# Patient Record
Sex: MALE | Race: WHITE | Employment: FULL TIME | URBAN - METROPOLITAN AREA
[De-identification: names, ages, dates, MRNs, and addresses within clinical notes are randomized per-mention and may not be internally consistent; named-entity substitution may affect disease eponyms.]

---

## 2017-01-10 ENCOUNTER — APPOINTMENT (OUTPATIENT)
Dept: GENERAL RADIOLOGY | Age: 42
End: 2017-01-10
Attending: EMERGENCY MEDICINE
Payer: COMMERCIAL

## 2017-01-10 ENCOUNTER — HOSPITAL ENCOUNTER (OUTPATIENT)
Age: 42
Setting detail: OBSERVATION
Discharge: HOME | End: 2017-01-12
Attending: EMERGENCY MEDICINE | Admitting: INTERNAL MEDICINE
Payer: COMMERCIAL

## 2017-01-10 DIAGNOSIS — I48.91 ATRIAL FIBRILLATION WITH RVR (HCC): Primary | ICD-10-CM

## 2017-01-10 DIAGNOSIS — R07.9 CHEST PAIN, UNSPECIFIED TYPE: ICD-10-CM

## 2017-01-10 LAB
ALBUMIN SERPL BCP-MCNC: 4 G/DL (ref 3.5–5)
ALBUMIN/GLOB SERPL: 1.1 {RATIO} (ref 1.2–3.5)
ALP SERPL-CCNC: 94 U/L (ref 50–136)
ALT SERPL-CCNC: 21 U/L (ref 12–65)
ANION GAP BLD CALC-SCNC: 9 MMOL/L (ref 7–16)
AST SERPL W P-5'-P-CCNC: 25 U/L (ref 15–37)
ATRIAL RATE: 133 BPM
BASOPHILS # BLD AUTO: 0 K/UL (ref 0–0.2)
BASOPHILS # BLD: 0 % (ref 0–2)
BILIRUB SERPL-MCNC: 1.4 MG/DL (ref 0.2–1.1)
BUN SERPL-MCNC: 8 MG/DL (ref 6–23)
CALCIUM SERPL-MCNC: 8.2 MG/DL (ref 8.3–10.4)
CALCULATED P AXIS, ECG09: NORMAL DEGREES
CALCULATED R AXIS, ECG10: 54 DEGREES
CALCULATED T AXIS, ECG11: 65 DEGREES
CHLORIDE SERPL-SCNC: 107 MMOL/L (ref 98–107)
CO2 SERPL-SCNC: 28 MMOL/L (ref 21–32)
CREAT SERPL-MCNC: 0.86 MG/DL (ref 0.8–1.5)
DIAGNOSIS, 93000: NORMAL
DIASTOLIC BP, ECG02: NORMAL MMHG
DIFFERENTIAL METHOD BLD: ABNORMAL
EOSINOPHIL # BLD: 0.2 K/UL (ref 0–0.8)
EOSINOPHIL NFR BLD: 5 % (ref 0.5–7.8)
ERYTHROCYTE [DISTWIDTH] IN BLOOD BY AUTOMATED COUNT: 13.1 % (ref 11.9–14.6)
GLOBULIN SER CALC-MCNC: 3.6 G/DL (ref 2.3–3.5)
GLUCOSE SERPL-MCNC: 109 MG/DL (ref 65–100)
HCT VFR BLD AUTO: 36.2 % (ref 41.1–50.3)
HGB BLD-MCNC: 12.7 G/DL (ref 13.6–17.2)
IMM GRANULOCYTES # BLD: 0 K/UL (ref 0–0.5)
IMM GRANULOCYTES NFR BLD AUTO: 0 % (ref 0–5)
LYMPHOCYTES # BLD AUTO: 29 % (ref 13–44)
LYMPHOCYTES # BLD: 1.4 K/UL (ref 0.5–4.6)
MCH RBC QN AUTO: 43.1 PG (ref 26.1–32.9)
MCHC RBC AUTO-ENTMCNC: 35.1 G/DL (ref 31.4–35)
MCV RBC AUTO: 122.7 FL (ref 79.6–97.8)
MONOCYTES # BLD: 0.8 K/UL (ref 0.1–1.3)
MONOCYTES NFR BLD AUTO: 17 % (ref 4–12)
NEUTS SEG # BLD: 2.3 K/UL (ref 1.7–8.2)
NEUTS SEG NFR BLD AUTO: 49 % (ref 43–78)
P-R INTERVAL, ECG05: NORMAL MS
PLATELET # BLD AUTO: 278 K/UL (ref 150–450)
PMV BLD AUTO: 9.4 FL (ref 10.8–14.1)
POTASSIUM SERPL-SCNC: 4.2 MMOL/L (ref 3.5–5.1)
PROT SERPL-MCNC: 7.6 G/DL (ref 6.3–8.2)
Q-T INTERVAL, ECG07: 298 MS
QRS DURATION, ECG06: 78 MS
QTC CALCULATION (BEZET), ECG08: 433 MS
RBC # BLD AUTO: 2.95 M/UL (ref 4.23–5.67)
SODIUM SERPL-SCNC: 144 MMOL/L (ref 136–145)
SYSTOLIC BP, ECG01: NORMAL MMHG
TROPONIN I BLD-MCNC: 0 NG/ML (ref 0–0.08)
TSH SERPL DL<=0.005 MIU/L-ACNC: 4.14 UIU/ML (ref 0.36–3.74)
VENTRICULAR RATE, ECG03: 127 BPM
WBC # BLD AUTO: 4.8 K/UL (ref 4.3–11.1)

## 2017-01-10 PROCEDURE — 84484 ASSAY OF TROPONIN QUANT: CPT

## 2017-01-10 PROCEDURE — 84443 ASSAY THYROID STIM HORMONE: CPT | Performed by: EMERGENCY MEDICINE

## 2017-01-10 PROCEDURE — 71010 XR CHEST PORT: CPT

## 2017-01-10 PROCEDURE — 99285 EMERGENCY DEPT VISIT HI MDM: CPT | Performed by: EMERGENCY MEDICINE

## 2017-01-10 PROCEDURE — 96375 TX/PRO/DX INJ NEW DRUG ADDON: CPT | Performed by: EMERGENCY MEDICINE

## 2017-01-10 PROCEDURE — G0378 HOSPITAL OBSERVATION PER HR: HCPCS

## 2017-01-10 PROCEDURE — 99218 HC RM OBSERVATION: CPT

## 2017-01-10 PROCEDURE — 74011250636 HC RX REV CODE- 250/636: Performed by: INTERNAL MEDICINE

## 2017-01-10 PROCEDURE — 80053 COMPREHEN METABOLIC PANEL: CPT | Performed by: EMERGENCY MEDICINE

## 2017-01-10 PROCEDURE — 93005 ELECTROCARDIOGRAM TRACING: CPT | Performed by: EMERGENCY MEDICINE

## 2017-01-10 PROCEDURE — 93005 ELECTROCARDIOGRAM TRACING: CPT | Performed by: INTERNAL MEDICINE

## 2017-01-10 PROCEDURE — 36415 COLL VENOUS BLD VENIPUNCTURE: CPT | Performed by: INTERNAL MEDICINE

## 2017-01-10 PROCEDURE — 96374 THER/PROPH/DIAG INJ IV PUSH: CPT | Performed by: EMERGENCY MEDICINE

## 2017-01-10 PROCEDURE — 85025 COMPLETE CBC W/AUTO DIFF WBC: CPT | Performed by: EMERGENCY MEDICINE

## 2017-01-10 PROCEDURE — 74011250637 HC RX REV CODE- 250/637: Performed by: EMERGENCY MEDICINE

## 2017-01-10 PROCEDURE — 96365 THER/PROPH/DIAG IV INF INIT: CPT

## 2017-01-10 PROCEDURE — 74011000250 HC RX REV CODE- 250: Performed by: EMERGENCY MEDICINE

## 2017-01-10 RX ORDER — SOTALOL HYDROCHLORIDE 80 MG/1
160 TABLET ORAL EVERY 12 HOURS
Status: DISCONTINUED | OUTPATIENT
Start: 2017-01-11 | End: 2017-01-11

## 2017-01-10 RX ORDER — HEPARIN SODIUM 5000 [USP'U]/100ML
12-25 INJECTION, SOLUTION INTRAVENOUS
Status: DISCONTINUED | OUTPATIENT
Start: 2017-01-10 | End: 2017-01-11

## 2017-01-10 RX ORDER — HEPARIN SODIUM 5000 [USP'U]/ML
4000 INJECTION, SOLUTION INTRAVENOUS; SUBCUTANEOUS ONCE
Status: COMPLETED | OUTPATIENT
Start: 2017-01-10 | End: 2017-01-10

## 2017-01-10 RX ORDER — DILTIAZEM HYDROCHLORIDE 120 MG/1
120 CAPSULE, COATED, EXTENDED RELEASE ORAL
Status: COMPLETED | OUTPATIENT
Start: 2017-01-10 | End: 2017-01-10

## 2017-01-10 RX ORDER — DILTIAZEM HYDROCHLORIDE 5 MG/ML
20 INJECTION INTRAVENOUS
Status: COMPLETED | OUTPATIENT
Start: 2017-01-10 | End: 2017-01-10

## 2017-01-10 RX ADMIN — HEPARIN SODIUM AND DEXTROSE 12 UNITS/KG/HR: 5000; 5 INJECTION INTRAVENOUS at 23:25

## 2017-01-10 RX ADMIN — DILTIAZEM HYDROCHLORIDE 120 MG: 120 CAPSULE, COATED, EXTENDED RELEASE ORAL at 18:32

## 2017-01-10 RX ADMIN — DILTIAZEM HYDROCHLORIDE 20 MG: 5 INJECTION INTRAVENOUS at 18:47

## 2017-01-10 RX ADMIN — HEPARIN SODIUM 4000 UNITS: 5000 INJECTION, SOLUTION INTRAVENOUS; SUBCUTANEOUS at 23:26

## 2017-01-10 NOTE — IP AVS SNAPSHOT
Current Discharge Medication List  
  
Take these medications at their scheduled times Dose & Instructions Dispensing Information Comments Morning Noon Evening Bedtime  
 aspirin 81 mg chewable tablet Your next dose is:  Tomorrow Dose:  81 mg Take 1 Tab by mouth daily. Quantity:  30 Tab Refills:  0  
     
  
   
   
   
  
 atorvastatin 80 mg tablet Commonly known as:  LIPITOR Your next dose is: Today Dose:  80 mg Take 1 Tab by mouth nightly. Quantity:  30 Tab Refills:  11  
     
   
   
   
  
  
 metoprolol tartrate 25 mg tablet Commonly known as:  LOPRESSOR Your next dose is: Today Dose:  25 mg Take 1 Tab by mouth every twelve (12) hours. Quantity:  60 Tab Refills:  11  
     
   
   
   
  
  
 prasugrel 10 mg tablet Commonly known as:  EFFIENT Your next dose is:  Tomorrow Dose:  10 mg Take 1 Tab by mouth daily. Quantity:  30 Tab Refills:  11 Take these medications as needed Dose & Instructions Dispensing Information Comments Morning Noon Evening Bedtime  
 nitroglycerin 0.4 mg SL tablet Commonly known as:  NITROSTAT Dose:  0.4 mg  
1 Tab by SubLINGual route every five (5) minutes as needed for Chest Pain. Quantity:  1 Bottle Refills:  5 Where to Get Your Medications Information about where to get these medications is not yet available ! Ask your nurse or doctor about these medications  
  aspirin 81 mg chewable tablet  
 atorvastatin 80 mg tablet  
 metoprolol tartrate 25 mg tablet  
 nitroglycerin 0.4 mg SL tablet  
 prasugrel 10 mg tablet

## 2017-01-10 NOTE — IP AVS SNAPSHOT
[address] 
[phone] Patient: Lia Burkitt MRN: [id number] You are allergic to the following No active allergies Immunizations Administered for This Admission Name Date Influenza Vaccine (Quad) PF 1/12/2017 Recent Documentation Height Weight BMI  
  
  
 1.753 m 83.1 kg 27.05 kg/m2 Emergency Contacts Name Discharge Info Relation Home Work Mobile Nya Friend  Spouse [3] [phone] About your hospitalization You were admitted on:  January 10, 2017 You last received care in the:  [facility] 2 CV STEPDOWN You were discharged on:  January 12, 2017 Unit phone number:  [phone] Why you were hospitalized Your primary diagnosis was:  Not on File Your diagnoses also included:  Paroxysmal Atrial Fibrillation (Hcc), Precordial Pain, Coronary Artery Disease Involving Native Coronary Artery Of Native Heart With Unstable Angina Pectoris (Hcc) Providers Seen During Your Hospitalizations Provider Role Specialty Primary office phone Ngozi Marcial MD Attending Provider Emergency Medicine [phone] Ra Adler MD Attending Provider Cardiology [phone] Your Primary Care Physician (PCP) Primary Care Physician Office Phone Office Fax UNKNOWN, PROVIDER ** None ** ** None ** Follow-up Information Follow up With Details Comments Contact Info Provider Unknown   Patient not available to ask Ra Adler MD On 1/17/2017 at 1 am in [place] office [address] [facility] [address] 
[phone] Your Appointments Tuesday January 17, 2017  8:30 AM EST TRANSITIONAL CARE MANAGEMENT with Ra Adler MD  
[facility] Cardiology ([address] [place] [address]) [address] [place]  
[address] [facility] 81  
[phone] Current Discharge Medication List  
  
START taking these medications Dose & Instructions Dispensing Information Comments Morning Noon Evening Bedtime  
 aspirin 81 mg chewable tablet Your next dose is:  Tomorrow Dose:  81 mg Take 1 Tab by mouth daily. Quantity:  30 Tab Refills:  0  
     
  
   
   
   
  
 atorvastatin 80 mg tablet Commonly known as:  LIPITOR Your next dose is: Today Dose:  80 mg Take 1 Tab by mouth nightly. Quantity:  30 Tab Refills:  11  
     
   
   
   
  
  
 metoprolol tartrate 25 mg tablet Commonly known as:  LOPRESSOR Your next dose is: Today Dose:  25 mg Take 1 Tab by mouth every twelve (12) hours. Quantity:  60 Tab Refills:  11  
     
   
   
   
  
  
 nitroglycerin 0.4 mg SL tablet Commonly known as:  NITROSTAT Dose:  0.4 mg  
1 Tab by SubLINGual route every five (5) minutes as needed for Chest Pain. Quantity:  1 Bottle Refills:  5  
     
   
   
   
  
 prasugrel 10 mg tablet Commonly known as:  EFFIENT Your next dose is:  Tomorrow Dose:  10 mg Take 1 Tab by mouth daily. Quantity:  30 Tab Refills:  11 Where to Get Your Medications Information on where to get these meds will be given to you by the nurse or doctor. ! Ask your nurse or doctor about these medications  
  aspirin 81 mg chewable tablet  
 atorvastatin 80 mg tablet  
 metoprolol tartrate 25 mg tablet  
 nitroglycerin 0.4 mg SL tablet  
 prasugrel 10 mg tablet Discharge Instructions DISCHARGE SUMMARY from Nurse The following personal items are in your possession at time of discharge: 
 
Dental Appliances: None Home Medications: None Jewelry: None Clothing: With patient sent home with patient Other Valuables: None PATIENT INSTRUCTIONS: 
 
 
F-face looks uneven A-arms unable to move or move unevenly S-speech slurred or non-existent T-time-call 911 as soon as signs and symptoms begin-DO NOT go Back to bed or wait to see if you get better-TIME IS BRAIN. Warning Signs of HEART ATTACK Call 911 if you have these symptoms: 
? Chest discomfort. Most heart attacks involve discomfort in the center of the chest that lasts more than a few minutes, or that goes away and comes back. It can feel like uncomfortable pressure, squeezing, fullness, or pain. ? Discomfort in other areas of the upper body. Symptoms can include pain or discomfort in one or both arms, the back, neck, jaw, or stomach. ? Shortness of breath with or without chest discomfort. ? Other signs may include breaking out in a cold sweat, nausea, or lightheadedness. Don't wait more than five minutes to call [address]! Fast action can save your life. Calling 911 is almost always the fastest way to get lifesaving treatment. Emergency Medical Services staff can begin treatment when they arrive  up to an hour sooner than if someone gets to the hospital by car. The discharge information has been reviewed with the patient and spouse. The patient and spouse verbalized understanding. Discharge medications reviewed with the patient and spouse and appropriate educational materials and side effects teaching were provided. Learning About Coronary Artery Disease (CAD) What is coronary artery disease? Coronary artery disease (CAD) occurs when plaque builds up in the arteries that bring oxygen-rich blood to your heart. Plaque is a fatty substance made of cholesterol, calcium, and other substances in the blood. This process is called hardening of the arteries, or atherosclerosis. What happens when you have coronary artery disease? · Plaque may narrow the coronary arteries. Narrowed arteries cause poor blood flow.  This can lead to angina symptoms such as chest pain or discomfort. If blood flow is completely blocked, you could have a heart attack. · You can slow CAD and reduce the risk of future problems by making changes in your lifestyle. These include quitting smoking and eating heart-healthy foods. · Treatments for CAD, along with changes in your lifestyle, can help you live a longer and healthier life. How can you prevent coronary artery disease? · Do not smoke. It may be the best thing you can do to prevent heart disease. If you need help quitting, talk to your doctor about stop-smoking programs and medicines. These can increase your chances of quitting for good. · Be active. Get at least 30 minutes of exercise on most days of the week. Walking is a good choice. You also may want to do other activities, such as running, swimming, cycling, or playing tennis or team sports. · Eat heart-healthy foods. Eat more fruits and vegetables and less foods that contain saturated and trans fats. Limit alcohol, sodium, and sweets. · Stay at a healthy weight. Lose weight if you need to. · Manage other health problems such as diabetes, high blood pressure, and high cholesterol. · Manage stress. Stress can hurt your heart. To keep stress low, talk about your problems and feelings. Don't keep your feelings hidden. · If you have talked about it with your doctor, take a low-dose aspirin every day. Aspirin can help certain people lower their risk of a heart attack or stroke. But taking aspirin isn't right for everyone, because it can cause serious bleeding. Do not start taking daily aspirin unless your doctor knows about it. How is coronary artery disease treated? · Your doctor will suggest that you make lifestyle changes. For example, your doctor may ask you to eat healthy foods, quit smoking, lose extra weight, and be more active. · You will have to take medicines.  
· Your doctor may suggest a procedure to open narrowed or blocked arteries. This is called angioplasty. Or your doctor may suggest using healthy blood vessels to create detours around narrowed or blocked arteries. This is called bypass surgery. Follow-up care is a key part of your treatment and safety. Be sure to make and go to all appointments, and call your doctor if you are having problems. It's also a good idea to know your test results and keep a list of the medicines you take. Where can you learn more? Go to http://lynette-gina.info/. Enter [phone] in the search box to learn more about \"Learning About Coronary Artery Disease (CAD). \" Current as of: January 27, 2016 Content Version: 11.1 © 3828-3617 ProBinder. Care instructions adapted under license by [employer] (which disclaims liability or warranty for this information). If you have questions about a medical condition or this instruction, always ask your healthcare professional. [place] [address] any warranty or liability for your use of this information. Percutaneous Coronary Intervention: What to Expect at [facility] Your Recovery Percutaneous coronary intervention (PCI) is the name for procedures that are used to open a narrowed or blocked coronary artery. The two most common PCI procedures are coronary angioplasty and coronary stent placement. Your groin or arm may have a bruise and feel sore for a day or two after a percutaneous coronary intervention (PCI). You can do light activities around the house, but nothing strenuous for several days. This care sheet gives you a general idea about how long it will take for you to recover. But each person recovers at a different pace. Follow the steps below to get better as quickly as possible. How can you care for yourself at home? Activity · Do not do strenuous exercise and do not lift, pull, or push anything heavy until your doctor says it is okay. This may be for a day or two.  You can walk around the house and do light activity, such as cooking. · You may shower 24 to 48 hours after the procedure, if your doctor okays it. Pat the incision dry. Do not take a bath for 1 week, or until your doctor tells you it is okay. · If the catheter was placed in your groin, try not to walk up stairs for the first couple of days. · If the catheter was placed in your arm near your wrist, do not bend your wrist deeply for the first couple of days. Be careful using your hand to get into and out of a chair or bed. · If your doctor recommends it, get more exercise. Walking is a good choice. Bit by bit, increase the amount you walk every day. Try for at least 30 minutes on most days of the week. Diet · Drink plenty of fluids to help your body flush out the dye. If you have kidney, heart, or liver disease and have to limit fluids, talk with your doctor before you increase the amount of fluids you drink. · Keep eating a heart-healthy diet that has lots of fruits, vegetables, and whole grains. If you have not been eating this way, talk to your doctor. You also may want to talk to a dietitian. This expert can help you to learn about healthy foods and plan meals. Medicines · Your doctor will tell you if and when you can restart your medicines. He or she will also give you instructions about taking any new medicines. · If you take blood thinners, such as warfarin (Coumadin), clopidogrel (Plavix), or aspirin, be sure to talk to your doctor. He or she will tell you if and when to start taking those medicines again. Make sure that you understand exactly what your doctor wants you to do. · Your doctor will prescribe blood-thinning medicines. You will likely take aspirin plus another antiplatelet, such as clopidogrel (Plavix). It is very important that you take these medicines exactly as directed. These medicines help keep the coronary artery open and reduce your risk of a heart attack. · Call your doctor if you think you are having a problem with your medicine. Care of the catheter site · For 1 or 2 days, keep a bandage over the spot where the catheter was inserted. The bandage probably will fall off in this time. · Put ice or a cold pack on the area for 10 to 20 minutes at a time to help with soreness or swelling. Put a thin cloth between the ice and your skin. Follow-up care is a key part of your treatment and safety. Be sure to make and go to all appointments, and call your doctor if you are having problems. It's also a good idea to know your test results and keep a list of the medicines you take. When should you call for help? Call 911 anytime you think you may need emergency care. For example, call if: 
· You passed out (lost consciousness). · You have severe trouble breathing. · You have sudden chest pain and shortness of breath, or you cough up blood. · You have symptoms of a heart attack, such as: ¨ Chest pain or pressure. ¨ Sweating. ¨ Shortness of breath. ¨ Nausea or vomiting. ¨ Pain that spreads from the chest to the neck, jaw, or one or both shoulders or arms. ¨ Dizziness or lightheadedness. ¨ A fast or uneven pulse. After calling 911, chew 1 adult-strength aspirin. Wait for an ambulance. Do not try to drive yourself. · You have been diagnosed with angina, and you have angina symptoms that do not go away with rest or are not getting better within 5 minutes after you take one dose of nitroglycerin. Call your doctor now or seek immediate medical care if: 
· You are bleeding from the area where the catheter was put in your artery. · You have a fast-growing, painful lump at the catheter site. · You have signs of infection, such as: 
¨ Increased pain, swelling, warmth, or redness. ¨ Red streaks leading from the catheter site. ¨ Pus draining from the catheter site. ¨ A fever. · Your leg or arm looks blue or feels cold, numb, or tingly. Watch closely for changes in your health, and be sure to contact your doctor if you have any problems. Where can you learn more? Go to http://lynette-gina.info/. Enter M655 in the search box to learn more about \"Percutaneous Coronary Intervention: What to Expect at Home. \" Current as of: January 27, 2016 Content Version: 11.1 © 8817-2229 Screenie. Care instructions adapted under license by [employer] (which disclaims liability or warranty for this information). If you have questions about a medical condition or this instruction, always ask your healthcare professional. [place] [address] any warranty or liability for your use of this information. Discharge Instructions Attachments/References HEART: GENERAL INFO (ENGLISH) HEALTHY DIET: HEART (ENGLISH) METOPROLOL (BY MOUTH) (ENGLISH) ASPIRIN: HEART ATTACK AND STROKE PREVENTION (ENGLISH) CARDIAC REHABILITATION (ENGLISH) HEART ATTACK: MEDICINE TO REDUCE RISK (ENGLISH) ATRIAL FIBRILLATION (ENGLISH) SMOKING: STOPPING (ENGLISH) SECONDHAND SMOKE (ENGLISH) Discharge Orders Procedure Order Date Status Priority Quantity Spec Type Associated Dx REFERRAL TO CARDIAC REHAB 01/12/17 1213 Normal Routine 1 [employer] Announcement We are excited to announce that we are making your provider's discharge notes available to you in [employer]. You will see these notes when they are completed and signed by the physician that discharged you from your recent hospital stay. If you have any questions or concerns about any information you see in [employer], please call the Health Information Department where you were seen or reach out to your Primary Care Provider for more information about your plan of care. Introducing [facility]!    
 [address] introduces [employer] patient portal. Now you can access parts of your medical record, email your doctor's office, and request medication refills online. 1. In your internet browser, go to [email or web address] 2. Click on the First Time User? Click Here link in the Sign In box. You will see the New Member Sign Up page. 3. Enter your [employer] Access Code exactly as it appears below. You will not need to use this code after youve completed the sign-up process. If you do not sign up before the expiration date, you must request a new code. · [employer] Access Code: [email or web address] Expires: 4/10/2017  6:10 PM 
 
4. Enter the last four digits of your Social Security Number (xxxx) and Date of Birth (mm/dd/yyyy) as indicated and click Submit. You will be taken to the next sign-up page. 5. Create a [employer] ID. This will be your [employer] login ID and cannot be changed, so think of one that is secure and easy to remember. 6. Create a [employer] password. You can change your password at any time. 7. Enter your Password Reset Question and Answer. This can be used at a later time if you forget your password. 8. Enter your e-mail address. You will receive e-mail notification when new information is available in [address] 9. Click Sign Up. You can now view and download portions of your medical record. 10. Click the Download Summary menu link to download a portable copy of your medical information. If you have questions, please visit the Frequently Asked Questions section of the [employer] website. Remember, [employer] is NOT to be used for urgent needs. For medical emergencies, dial 911. Now available from your iPhone and Android! General Information Please provide this summary of care documentation to your next provider. Patient Signature:  ____________________________________________________________ Date:  ____________________________________________________________  
  
Paulene Greener Provider Signature:  ____________________________________________________________ Date:  ____________________________________________________________ More Information A Healthy Heart: Care Instructions Your Care Instructions Heart disease occurs when a substance called plaque builds up in the vessels that supply oxygen-rich blood to your heart. This can narrow the blood vessels and reduce blood flow. A heart attack happens when blood flow is completely blocked. A high-fat diet, smoking, and other factors increase the risk of heart disease. Your doctor has found that you have a chance of having heart disease. You can do lots of things to keep your heart healthy. It may not be easy, but you can change your diet, exercise more, and quit smoking. These steps really work to lower your chance of heart disease. Follow-up care is a key part of your treatment and safety. Be sure to make and go to all appointments, and call your doctor if you are having problems. It's also a good idea to know your test results and keep a list of the medicines you take. How can you care for yourself at home? Diet · Use less salt when you cook and eat. This helps lower your blood pressure. Taste food before salting. Add only a little salt when you think you need it. With time, your taste buds will adjust to less salt. · Eat fewer snack items, fast foods, canned soups, and other high-salt, high-fat, processed foods. · Read food labels and try to avoid saturated and trans fats. They increase your risk of heart disease by raising cholesterol levels. · Limit the amount of solid fat-butter, margarine, and shortening-you eat. Use olive, peanut, or canola oil when you cook. Bake, broil, and steam foods instead of frying them. · Eating fish can lower your risk for heart disease. Eat at least 2 servings of fish a week. [place], mackerel, herring, sardines, and chunk light tuna are very good choices. These fish contain omega-3 fatty acids. · Eat a variety of fruit and vegetables every day. Dark green, deep orange, red, or yellow fruits and vegetables are especially good for you. Examples include spinach, carrots, peaches, and berries. · Foods high in fiber can reduce your cholesterol and provide important vitamins and minerals. High-fiber foods include whole-grain cereals and breads, oatmeal, beans, brown rice, citrus fruits, and apples. · Limit drinks and foods with added sugar. These include candy, desserts, and soda pop. Lifestyle changes · If your doctor recommends it, get more exercise. Walking is a good choice. Bit by bit, increase the amount you walk every day. Try for at least 30 minutes on most days of the week. You also may want to swim, bike, or do other activities. · Do not smoke. If you need help quitting, talk to your doctor about stop-smoking programs and medicines. These can increase your chances of quitting for good. Quitting smoking may be the most important step you can take to protect your heart. It is never too late to quit. You will get health benefits right away. · Limit alcohol to 2 drinks a day for men and 1 drink a day for women. Too much alcohol can cause health problems. Medicines · Take your medicines exactly as prescribed. Call your doctor if you think you are having a problem with your medicine. · If your doctor recommends aspirin, take the amount directed each day. Make sure you take aspirin and not another kind of pain reliever, such as acetaminophen (Tylenol). If you take ibuprofen (such as Advil or Motrin) for other problems, take aspirin at least 2 hours before taking ibuprofen. When should you call for help? Call 911 if you have symptoms of a heart attack. These may include: 
· You have chest pain or pressure. This may occur with: 
¨ Chest pain or pressure, or a strange feeling in the chest. 
¨ Sweating. ¨ Shortness of breath.  
¨ Pain, pressure, or a strange feeling in the back, neck, jaw, or upper belly or in one or both shoulders or arms. ¨ Lightheadedness or sudden weakness. ¨ A fast or irregular heartbeat. After you call 911, the  may tell you to chew 1 adult-strength or 2 to 4 low-dose aspirin. Wait for an ambulance. Do not try to drive yourself. Watch closely for changes in your health, and be sure to contact your doctor if: 
· Your symptoms are slowly getting worse. · You do not get better as expected. Where can you learn more? Go to http://lynette-gina.info/. Enter K571 in the search box to learn more about \"A Healthy Heart: Care Instructions. \" Current as of: January 27, 2016 Content Version: 11.1 © 8837-7794 Degree Controls. Care instructions adapted under license by [employer] (which disclaims liability or warranty for this information). If you have questions about a medical condition or this instruction, always ask your healthcare professional. [place] [address] any warranty or liability for your use of this information. Heart-Healthy Diet: Care Instructions Your Care Instructions A heart-healthy diet has lots of vegetables, fruits, nuts, beans, and whole grains, and is low in salt. It limits foods that are high in saturated fat, such as meats, cheeses, and fried foods. It may be hard to change your diet, but even small changes can lower your risk of heart attack and heart disease. Follow-up care is a key part of your treatment and safety. Be sure to make and go to all appointments, and call your doctor if you are having problems. It's also a good idea to know your test results and keep a list of the medicines you take. How can you care for yourself at home? Watch your portions · Learn what a serving is. A \"serving\" and a \"portion\" are not always the same thing. Make sure that you are not eating larger portions than are recommended. For example, a serving of pasta is ½ cup.  A serving size of meat is 2 to 3 ounces. A 3-ounce serving is about the size of a deck of cards. Measure serving sizes until you are good at Herman" them. Keep in mind that restaurants often serve portions that are 2 or 3 times the size of one serving. · To keep your energy level up and keep you from feeling hungry, eat often but in smaller portions. · Eat only the number of calories you need to stay at a healthy weight. If you need to lose weight, eat fewer calories than your body burns (through exercise and other physical activity). Eat more fruits and vegetables · Eat a variety of fruit and vegetables every day. Dark green, deep orange, red, or yellow fruits and vegetables are especially good for you. Examples include spinach, carrots, peaches, and berries. · Keep carrots, celery, and other veggies handy for snacks. Buy fruit that is in season and store it where you can see it so that you will be tempted to eat it. · Cook dishes that have a lot of veggies in them, such as stir-fries and soups. Limit saturated and trans fat · Read food labels, and try to avoid saturated and trans fats. They increase your risk of heart disease. Trans fat is found in many processed foods such as cookies and crackers. · Use olive or canola oil when you cook. Try cholesterol-lowering spreads, such as Benecol or Take Control. · Bake, broil, grill, or steam foods instead of frying them. · Choose lean meats instead of high-fat meats such as hot dogs and sausages. Cut off all visible fat when you prepare meat. · Eat fish, skinless poultry, and meat alternatives such as soy products instead of high-fat meats. Soy products, such as tofu, may be especially good for your heart. · Choose low-fat or fat-free milk and dairy products. Eat fish · Eat at least two servings of fish a week. Certain fish, such as salmon and tuna, contain omega-3 fatty acids, which may help reduce your risk of heart attack. Eat foods high in fiber · Eat a variety of grain products every day. Include whole-grain foods that have lots of fiber and nutrients. Examples of whole-grain foods include oats, whole wheat bread, and brown rice. · Buy whole-grain breads and cereals, instead of white bread or pastries. Limit salt and sodium · Limit how much salt and sodium you eat to help lower your blood pressure. · Taste food before you salt it. Add only a little salt when you think you need it. With time, your taste buds will adjust to less salt. · Eat fewer snack items, fast foods, and other high-salt, processed foods. Check food labels for the amount of sodium in packaged foods. · Choose low-sodium versions of canned goods (such as soups, vegetables, and beans). Limit sugar · Limit drinks and foods with added sugar. These include candy, desserts, and soda pop. Limit alcohol · Limit alcohol to no more than 2 drinks a day for men and 1 drink a day for women. Too much alcohol can cause health problems. When should you call for help? Watch closely for changes in your health, and be sure to contact your doctor if: 
· You would like help planning heart-healthy meals. Where can you learn more? Go to http://lynetteSwapBeatsgina.info/. Enter V137 in the search box to learn more about \"Heart-Healthy Diet: Care Instructions. \" Current as of: January 27, 2016 Content Version: 11.1 © 9077-1991 [employer]. Care instructions adapted under license by [employer] (which disclaims liability or warranty for this information). If you have questions about a medical condition or this instruction, always ask your healthcare professional. [place] [address] any warranty or liability for your use of this information. Metoprolol (By mouth) Metoprolol (met-oh-PROE-lol) Treats high blood pressure, angina (chest pain), and heart failure. May lower the risk of death after a heart attack.  This medicine is a beta-blocker. Brand Name(s):Lopressor, Toprol XL There may be other brand names for this medicine. When This Medicine Should Not Be Used: This medicine is not right for everyone. Do not use if you had an allergic reaction to metoprolol or similar medicines. Do not use this medicine if you have certain blood circulation or heart problems. Ask your doctor about these problems. How to Use This Medicine:  
Tablet, Long Acting Tablet · Take your medicine as directed. Your dose may need to be changed several times to find what works best for you. · Take this medicine with a meal or right after a meal. Take this medicine the same way every day, at the same time. · Swallow the tablet whole with a glass of water. You may break the extended-release tablet in half, but do not chew or crush it. · Missed dose: Take a dose as soon as you remember. If it is almost time for your next dose, wait until then and take a regular dose. Do not take extra medicine to make up for a missed dose. · Store the medicine in a closed container at room temperature, away from heat, moisture, and direct light. Drugs and Foods to Avoid: Ask your doctor or pharmacist before using any other medicine, including over-the-counter medicines, vitamins, and herbal products. · Some medicines can affect how metoprolol works. Tell your doctor if you are taking any of the following: ¨ Digoxin, dipyridamole, hydralazine, hydroxychloroquine, methyldopa, quinidine ¨ Medicine to treat depression (such as bupropion, clomipramine, desipramine, fluoxetine, fluvoxamine, paroxetine, sertraline), medicine to treat mental illness (such as chlorpromazine, fluphenazine, haloperidol, thioridazine), medicine for heart rhythm problems (such as propafenone), HIV/AIDS medicine (such as ritonavir), medicine to treat a fungus infection (such as terbinafine), a monoamine oxidase inhibitor (MAOI), an ergot medicine for headaches, a calcium channel blocker (such as amlodipine, diltiazem, verapamil), or an alpha blocker (such as clonidine, prazosin, reserpine, guanethidine) Warnings While Using This Medicine: · Tell your doctor if you are pregnant or breastfeeding, or if you have blood vessel, heart, or circulation problems (such as heart failure, rhythm problems, or slow heartbeat). Tell your doctor if you have kidney disease, liver disease, diabetes, lung disease (such as asthma), an overactive thyroid, or a history of allergies. · This medicine may cause worse symptoms of heart failure while the dose is being adjusted. · Do not stop using this medicine suddenly. Your doctor will need to slowly decrease your dose before you stop it completely. · Tell any doctor or dentist who treats you that you are using this medicine. You may need to stop using this medicine several days before you have surgery or medical tests. · This medicine could lower your blood pressure too much, especially when you first use it or if you are dehydrated. Stand or sit up slowly if you feel lightheaded or dizzy. · This medicine may make you dizzy or drowsy. Do not drive or do anything else that could be dangerous until you know how this medicine affects you. · Your doctor will check your progress and the effects of this medicine at regular visits. Keep all appointments. · Keep all medicine out of the reach of children. Never share your medicine with anyone. Possible Side Effects While Using This Medicine:  
Call your doctor right away if you notice any of these side effects: · Allergic reaction: Itching or hives, swelling in your face or hands, swelling or tingling in your mouth or throat, chest tightness, trouble breathing · Lightheadedness, dizziness, or fainting · Slow heartbeat · Swelling in your hands, ankles, or feet, trouble breathing, tiredness · Worsening chest pain If you notice these less serious side effects, talk with your doctor: · Diarrhea · Mild dizziness or tiredness If you notice other side effects that you think are caused by this medicine, tell your doctor. Call your doctor for medical advice about side effects. You may report side effects to FDA at 3-159-XGC-0299 © 2016 [address] is for End User's use only and may not be sold, redistributed or otherwise used for commercial purposes. The above information is an  only. It is not intended as medical advice for individual conditions or treatments. Talk to your doctor, nurse or pharmacist before following any medical regimen to see if it is safe and effective for you. Taking Aspirin to Prevent Heart Attack and Stroke: Care Instructions Your Care Instructions Aspirin acts as a \"blood thinner. \" It prevents blood clots from forming. When taken during and after a heart attack, it can reduce your chance of dying. And it's used if you have a stent in your coronary artery. Also, aspirin helps certain people lower their risk of a heart attack or stroke. Be sure you know what dose of aspirin to take and how often to take it. Low-dose aspirin is typically 81 mg. But the dose for daily aspirin can range from 81 mg to 325 mg. Taking aspirin every day can cause bleeding. It may not be safe if you have stomach ulcers. And it may not be safe if you have high blood pressure that is not controlled. If you take aspirin pills every day, do not take ones that have other ingredients such as caffeine or sodium. Before you start to take aspirin, tell your doctor all the medicines, vitamins, herbal products, and supplements you take. Follow-up care is a key part of your treatment and safety. Be sure to make and go to all appointments, and call your doctor if you are having problems. It's also a good idea to know your test results and keep a list of the medicines you take. How can you care for yourself at home? · Take aspirin with a full glass of water unless your doctor tells you not to. Do not lie down right after you take it. · If you have a stent in your coronary artery, take your aspirin as your heart doctor says to. If another doctor says to stop taking the aspirin for any reason, talk to your heart doctor before you stop. · Do not chew or crush the coated or sustained-release forms of aspirin. · Ask your doctor if you can drink alcohol while you take aspirin. And ask how much you can drink. Too much alcohol with aspirin can cause stomach bleeding. · Do not take aspirin if you are pregnant, unless your doctor says it is okay. · Keep all aspirin out of children's reach. · Throw aspirin away if it starts to smell like vinegar. · Do not take aspirin if you have gout or if you take prescription blood thinners, unless your doctor has told you to. · Do not take prescription or over-the-counter medicines, vitamins, herbal products, or supplements without talking to your doctor first. Florencia Pare the label before you take another over-the-counter medicine. Many contain aspirin. So they could cause you to take too much aspirin. · Talk with your doctor before you take a pain medicine. Ask which type of medicine you can take and how to take it safely with aspirin. · Tell your doctor or dentist before a surgery or procedure that you take aspirin. He or she will tell you if you should stop taking aspirin before your surgery or procedure. Make sure that you understand exactly what your doctor wants you to do. Where can you learn more? Go to http://lynette-gina.info/. Enter O802 in the search box to learn more about \"Taking Aspirin to Prevent Heart Attack and Stroke: Care Instructions. \" Current as of: January 27, 2016 Content Version: 11.1 © 4382-5068 [employer].  Care instructions adapted under license by [employer] (which disclaims liability or warranty for this information). If you have questions about a medical condition or this instruction, always ask your healthcare professional. [address] any warranty or liability for your use of this information. Cardiac Rehabilitation: Care Instructions Your Care Instructions Cardiac rehabilitation is a program for people who have a heart problem, such as a heart attack, heart failure, or a heart valve disease. The program includes exercise, lifestyle changes, education, and emotional support. Cardiac rehab can help you improve the quality of your life through better overall health. It can help you lose weight and feel better about yourself. On your cardiac rehab team, you may have your doctor, a nurse specialist, a dietitian, and a physical therapist. They will design your cardiac rehab program specifically for you. You will learn how to reduce your risk for heart problems, how to manage stress, and how to eat a heart-healthy diet. By the end of the program, you will be ready to maintain a healthier lifestyle on your own. Follow-up care is a key part of your treatment and safety. Be sure to make and go to all appointments, and call your doctor if you are having problems. It's also a good idea to know your test results and keep a list of the medicines you take. How can you care for yourself at home? · Take your medicines exactly as prescribed. Call your doctor if you think you are having a problem with your medicine. You will get more details on the specific medicines your doctor prescribes. · Weigh yourself every day if your doctor tells you to. Watch for sudden weight gain. Weigh yourself on the same scale with the same amount of clothing at the same time of day. · Plan your meals so that you are eating heart-healthy foods. ¨ Eat a variety of foods daily. Fresh fruits and vegetables and whole-grains are good choices. ¨ Limit your fat intake, especially saturated and trans fat. ¨ Limit salt (sodium). ¨ Increase fiber in your diet. ¨ Limit alcohol. · Learn how to take your pulse so that you can track your heart rate during exercise. · Always check with your doctor before you begin a new exercise program. 
· Warm up before you exercise and cool down afterward for at least 15 minutes each. This will help your heart gradually prepare for and recover from exercise and avoid pushing your heart too hard. · Stop exercising if you have any unusual discomfort, such as chest pain. · Do not smoke. Smoking can make heart problems worse. If you need help quitting, talk to your doctor about stop-smoking programs and medicines. These can increase your chances of quitting for good. When should you call for help? Call 911 anytime you think you may need emergency care. For example, call if: 
· You have severe trouble breathing. · You cough up pink, foamy mucus and you have trouble breathing. · You have symptoms of a heart attack. These may include: ¨ Chest pain or pressure, or a strange feeling in the chest. 
¨ Sweating. ¨ Shortness of breath. ¨ Nausea or vomiting. ¨ Pain, pressure, or a strange feeling in the back, neck, jaw, or upper belly or in one or both shoulders or arms. ¨ Lightheadedness or sudden weakness. ¨ A fast or irregular heartbeat. After you call 911, the  may tell you to chew 1 adult-strength or 2 to 4 low-dose aspirin. Wait for an ambulance. Do not try to drive yourself. · You have angina symptoms (such as chest pain or pressure) that do not go away with rest or are not getting better within 5 minutes after you take a dose of nitroglycerin. · You have symptoms of a stroke. These may include: 
¨ Sudden numbness, tingling, weakness, or loss of movement in your face, arm, or leg, especially on only one side of your body. ¨ Sudden vision changes. ¨ Sudden trouble speaking. ¨ Sudden confusion or trouble understanding simple statements. ¨ Sudden problems with walking or balance. ¨ A sudden, severe headache that is different from past headaches. · You passed out (lost consciousness). Call your doctor now or seek immediate medical care if: 
· You have new or increased shortness of breath. · You are dizzy or lightheaded, or you feel like you may faint. · You gain weight suddenly, such as 3 pounds or more in 2 to 3 days. (Your doctor may suggest a different range of weight gain.) · You have increased swelling in your legs, ankles, or feet. Watch closely for changes in your health, and be sure to contact your doctor if you have any problems. Where can you learn more? Go to http://lynette-gina.info/. Enter R904 in the search box to learn more about \"Cardiac Rehabilitation: Care Instructions. \" Current as of: May 5, 2016 Content Version: 11.1 © 8924-6303 [employer]. Care instructions adapted under license by [employer] (which disclaims liability or warranty for this information). If you have questions about a medical condition or this instruction, always ask your healthcare professional. [place] [address] any warranty or liability for your use of this information. Reducing Heart Attack Risk With Daily Medicine: Care Instructions Your Care Instructions Heart disease is the number one cause of death. If you are at risk for heart disease, there are many medicines that can reduce your risk. These include: · ACE inhibitors. These are a type of blood pressure medicine. They can reduce the risk of heart attacks and strokes if you are at high risk. · Statin medicines. These lower cholesterol. They can also reduce the risk of heart disease and strokes. · Aspirin. It can help certain people lower their risk of a heart attack or stroke. · Beta-blocker medicines.  These are a type of blood pressure and heart medicine. They can reduce the chance of early death if you have had a heart attack. All medicines can cause side effects. So it is important to understand the pros and cons of any medicine you take. It is also important to take your medicines exactly as your doctor tells you to. Follow-up care is a key part of your treatment and safety. Be sure to make and go to all appointments, and call your doctor if you are having problems. It's also a good idea to know your test results and keep a list of the medicines you take. ACE inhibitors ACE (angiotensin-converting enzyme) inhibitors are used for three main reasons. They lower blood pressure, protect the kidneys, and prevent heart attacks and strokes. Examples include benazepril (Lotensin), lisinopril (Prinivil, Zestril), and ramipril (Altace). Before you start taking an ACE inhibitor, make sure your doctor knows if: 
· You are taking a water pill (diuretic). · You are taking potassium pills or using salt substitutes. · You are pregnant or breastfeeding. · You have had a kidney transplant or other kidney problems. ACE inhibitors can cause side effects. Call your doctor right away if you have: · Trouble breathing. · Swelling in your face, head, neck, or tongue. · Dizziness or lightheadedness. · A dry cough. Statins Statins lower cholesterol. Examples include atorvastatin (Lipitor), lovastatin (Mevacor), pravastatin (Pravachol), and simvastatin (Zocor). Before you start taking a statin, make sure your doctor knows if: 
· You have had a kidney transplant or other kidney problems. · You have liver disease. · You take any other prescription medicine, over-the-counter medicine, vitamins, supplements, or herbal remedies. · You are pregnant or breastfeeding. Statins can cause side effects. Call your doctor right away if you have: · New, severe muscle aches. · Brown urine. Aspirin Taking an aspirin every day can lower your risk for a heart attack.  A heart attack occurs when a blood vessel in the heart gets blocked. When this happens, oxygen can't get to the heart muscle, and part of the heart dies. Aspirin can help prevent blood clots that can block the blood vessels. Talk to your doctor before you start taking aspirin every day. He or she may recommend that you take one low-dose aspirin (81 mg) tablet each day, with a meal and a full glass of water. Taking aspirin isn't right for everyone, because it can cause serious bleeding. And you may not be able to use aspirin if you: 
· Have asthma. · Have an ulcer or other stomach problem. · Take some other medicine (called a blood thinner) that prevents blood clots. · Are allergic to aspirin. Before having a surgery or procedure, tell your doctor or dentist that you take aspirin. He or she will tell you if you should stop taking aspirin beforehand. Make sure that you understand exactly what your doctor wants you to do. Aspirin can cause side effects. Call your doctor right away if you have: · Unusual bleeding or bruising. · Nausea, vomiting, or heartburn. · Black or bloody stools. Beta-blockers Beta-blockers are used for three main reasons. They lower blood pressure, relieve angina symptoms (such as chest pain or pressure), and reduce the chances of a second heart attack. They include atenolol (Tenormin), carvedilol (Coreg), and metoprolol (Lopressor). Before you start taking a beta-blocker, make sure your doctor knows if you have: · Severe asthma or frequent asthma attacks. · A very slow pulse (less than 55 beats a minute). Beta-blockers can cause side effects. Call your doctor right away if you have: · Wheezing or trouble breathing. · Dizziness or lightheadedness. · Asthma that gets worse. When should you call for help? Call 911 anytime you think you may need emergency care. For example, call if: 
· You passed out (lost consciousness). Call your doctor now or seek immediate medical care if: · You are wheezing or have trouble breathing. · You have swelling in your face, head, neck, or tongue. · You are dizzy or lightheaded, or you feel like you may faint. · You have severe muscle pain, weakness, or brown urine. · You have vision problems. · You have new bruises or blood spots under your skin. · Your stools are black and tarlike or have streaks of blood. Watch closely for changes in your health, and be sure to contact your doctor if: 
· You have ringing in your ears. · You feel very tired. · You have gas, constipation, or an upset stomach. Where can you learn more? Go to http://lynetteUfreegina.info/. Enter R428 in the search box to learn more about \"Reducing Heart Attack Risk With Daily Medicine: Care Instructions. \" Current as of: March 28, 2016 Content Version: 11.1 © 6841-6461 [employer]. Care instructions adapted under license by [employer] (which disclaims liability or warranty for this information). If you have questions about a medical condition or this instruction, always ask your healthcare professional. [place] [address] any warranty or liability for your use of this information. Atrial Fibrillation: Care Instructions Your Care Instructions Atrial fibrillation is an irregular and often fast heartbeat. Treating this condition is important for several reasons. It can cause blood clots, which can travel from your heart to your brain and cause a stroke. If you have a fast heartbeat, you may feel lightheaded, dizzy, and weak. An irregular heartbeat can also increase your risk for heart failure. Atrial fibrillation is often the result of another heart condition, such as high blood pressure or coronary artery disease. Making changes to improve your heart condition will help you stay healthy and active. Follow-up care is a key part of your treatment and safety.  Be sure to make and go to all appointments, and call your doctor if you are having problems. It's also a good idea to know your test results and keep a list of the medicines you take. How can you care for yourself at home? Medicines · Take your medicines exactly as prescribed. Call your doctor if you think you are having a problem with your medicine. You will get more details on the specific medicines your doctor prescribes. · If your doctor has given you a blood thinner to prevent a stroke, be sure you get instructions about how to take your medicine safely. Blood thinners can cause serious bleeding problems. · Do not take any vitamins, over-the-counter drugs, or herbal products without talking to your doctor first. 
Lifestyle changes · Do not smoke. Smoking can increase your chance of a stroke and heart attack. If you need help quitting, talk to your doctor about stop-smoking programs and medicines. These can increase your chances of quitting for good. · Eat a heart-healthy diet. · Stay at a healthy weight. Lose weight if you need to. · Limit alcohol to 2 drinks a day for men and 1 drink a day for women. Too much alcohol can cause health problems. · Avoid colds and flu. Get a pneumococcal vaccine shot. If you have had one before, ask your doctor whether you need another dose. Get a flu shot every year. If you must be around people with colds or flu, wash your hands often. Activity · If your doctor recommends it, get more exercise. Walking is a good choice. Bit by bit, increase the amount you walk every day. Try for at least 30 minutes on most days of the week. You also may want to swim, bike, or do other activities. Your doctor may suggest that you join a cardiac rehabilitation program so that you can have help increasing your physical activity safely. · Start light exercise if your doctor says it is okay. Even a small amount will help you get stronger, have more energy, and manage stress.  Walking is an easy way to get exercise. Start out by walking a little more than you did in the hospital. Gradually increase the amount you walk. · When you exercise, watch for signs that your heart is working too hard. You are pushing too hard if you cannot talk while you are exercising. If you become short of breath or dizzy or have chest pain, sit down and rest immediately. · Check your pulse regularly. Place two fingers on the artery at the palm side of your wrist, in line with your thumb. If your heartbeat seems uneven or fast, talk to your doctor. When should you call for help? Call 911 anytime you think you may need emergency care. For example, call if: 
· You have symptoms of a heart attack. These may include: ¨ Chest pain or pressure, or a strange feeling in the chest. 
¨ Sweating. ¨ Shortness of breath. ¨ Nausea or vomiting. ¨ Pain, pressure, or a strange feeling in the back, neck, jaw, or upper belly or in one or both shoulders or arms. ¨ Lightheadedness or sudden weakness. ¨ A fast or irregular heartbeat. After you call 911, the  may tell you to chew 1 adult-strength or 2 to 4 low-dose aspirin. Wait for an ambulance. Do not try to drive yourself. · You have symptoms of a stroke. These may include: 
¨ Sudden numbness, tingling, weakness, or loss of movement in your face, arm, or leg, especially on only one side of your body. ¨ Sudden vision changes. ¨ Sudden trouble speaking. ¨ Sudden confusion or trouble understanding simple statements. ¨ Sudden problems with walking or balance. ¨ A sudden, severe headache that is different from past headaches. · You passed out (lost consciousness). Call your doctor now or seek immediate medical care if: 
· You have new or increased shortness of breath. · You feel dizzy or lightheaded, or you feel like you may faint. · Your heart rate becomes irregular. · You can feel your heart flutter in your chest or skip heartbeats.  Tell your doctor if these symptoms are new or worse. Watch closely for changes in your health, and be sure to contact your doctor if you have any problems. Where can you learn more? Go to http://lynette-gina.info/. Enter U020 in the search box to learn more about \"Atrial Fibrillation: Care Instructions. \" Current as of: January 27, 2016 Content Version: 11.1 © 9426-6542 [employer]. Care instructions adapted under license by [employer] (which disclaims liability or warranty for this information). If you have questions about a medical condition or this instruction, always ask your healthcare professional. [place] [address] any warranty or liability for your use of this information. Stopping Smoking: Care Instructions Your Care Instructions Cigarette smokers crave the nicotine in cigarettes. Giving it up is much harder than simply changing a habit. Your body has to stop craving the nicotine. It is hard to quit, but you can do it. There are many tools that people use to quit smoking. You may find that combining tools works best for you. There are several steps to quitting. First you get ready to quit. Then you get support to help you. After that, you learn new skills and behaviors to become a nonsmoker. For many people, a necessary step is getting and using medicine. Your doctor will help you set up the plan that best meets your needs. You may want to attend a smoking cessation program to help you quit smoking. When you choose a program, look for one that has proven success. Ask your doctor for ideas. You will greatly increase your chances of success if you take medicine as well as get counseling or join a cessation program. 
Some of the changes you feel when you first quit tobacco are uncomfortable. Your body will miss the nicotine at first, and you may feel short-tempered and grumpy. You may have trouble sleeping or concentrating. Medicine can help you deal with these symptoms. You may struggle with changing your smoking habits and rituals. The last step is the tricky one: Be prepared for the smoking urge to continue for a time. This is a lot to deal with, but keep at it. You will feel better. Follow-up care is a key part of your treatment and safety. Be sure to make and go to all appointments, and call your doctor if you are having problems. Its also a good idea to know your test results and keep a list of the medicines you take. How can you care for yourself at home? · Ask your family, friends, and coworkers for support. You have a better chance of quitting if you have help and support. · Join a support group, such as Nicotine Anonymous, for people who are trying to quit smoking. · Consider signing up for a smoking cessation program, such as the American Lung Association's Freedom from Smoking program. 
· Set a quit date. Pick your date carefully so that it is not right in the middle of a big deadline or stressful time. Once you quit, do not even take a puff. Get rid of all ashtrays and lighters after your last cigarette. Clean your house and your clothes so that they do not smell of smoke. · Learn how to be a nonsmoker. Think about ways you can avoid those things that make you reach for a cigarette. ¨ Avoid situations that put you at greatest risk for smoking. For some people, it is hard to have a drink with friends without smoking. For others, they might skip a coffee break with coworkers who smoke. ¨ Change your daily routine. Take a different route to work or eat a meal in a different place. · Cut down on stress. Calm yourself or release tension by doing an activity you enjoy, such as reading a book, taking a hot bath, or gardening. · Talk to your doctor or pharmacist about nicotine replacement therapy, which replaces the nicotine in your body.  You still get nicotine but you do not use tobacco. Nicotine replacement products help you slowly reduce the amount of nicotine you need. These products come in several forms, many of them available over-the-counter: ¨ Nicotine patches ¨ Nicotine gum and lozenges ¨ Nicotine inhaler · Ask your doctor about bupropion (Wellbutrin) or varenicline (Chantix), which are prescription medicines. They do not contain nicotine. They help you by reducing withdrawal symptoms, such as stress and anxiety. · Some people find hypnosis, acupuncture, and massage helpful for ending the smoking habit. · Eat a healthy diet and get regular exercise. Having healthy habits will help your body move past its craving for nicotine. · Be prepared to keep trying. Most people are not successful the first few times they try to quit. Do not get mad at yourself if you smoke again. Make a list of things you learned and think about when you want to try again, such as next week, next month, or next year. Where can you learn more? Go to http://lynette-gina.info/. Enter B646 in the search box to learn more about \"Stopping Smoking: Care Instructions. \" Current as of: May 26, 2016 Content Version: 11.1 © 6248-4283 [employer]. Care instructions adapted under license by [employer] (which disclaims liability or warranty for this information). If you have questions about a medical condition or this instruction, always ask your healthcare professional. [place] [address] any warranty or liability for your use of this information. Secondhand Smoke: Care Instructions Your Care Instructions Secondhand smoke comes from the burning end of a cigarette, cigar, or pipe and the smoke that a smoker exhales. The smoke contains nicotine and many other harmful chemicals.  Breathing secondhand smoke can cause or worsen health problems including cancer, asthma, coronary artery disease, and respiratory infections. It can make your eyes and nose burn and cause a sore throat. Secondhand smoke is especially bad for babies and young children whose lungs are still developing. Babies whose parents smoke are more likely to have ear infections, pneumonia, and bronchitis in the first few years of their lives. Secondhand smoke can make asthma symptoms worse in children. If you are pregnant, it is important that you not smoke and that you avoid secondhand smoke. You are more likely to give birth to a baby who weighs less than expected (low birth weight) if you smoke. And your baby may have a greater risk for sudden infant death syndrome (SIDS). Babies whose mothers are exposed to secondhand smoke during pregnancy have a higher risk for health problems. Follow-up care is a key part of your treatment and safety. Be sure to make and go to all appointments, and call your doctor if you are having problems. It's also a good idea to know your test results and keep a list of the medicines you take. How can you care for yourself at home? · Do not smoke or let anyone else smoke in your home. If people must smoke, ask them to go outside. · If people do smoke in your home, choose a room where you can open a window or use a fan to get the smoke outside. · Do not let anyone smoke in your car. If someone must smoke, pull over in a safe place and let him or her smoke away from the car. · Ask your employer to make sure that you have a smoke-free work area. · Make sure that your children are not exposed to secondhand smoke at day care, school, and after-school programs. · Try to choose nonsmoking bars, restaurants, and other public places when you go out. · Help your family and friends who smoke to quit by encouraging them to try. Tell them about treatment resources. Having support from others often helps. · If you smoke, quit. Quitting is hard, but there are ways to boost your chance of quitting tobacco for good. ¨ Use nicotine gum, patches, or lozenges. Call a quitline. Ask your doctor about stop-smoking programs and medicines. ¨ Keep trying. When should you call for help? Watch closely for changes in your health, and be sure to contact your doctor if you have any problems. Where can you learn more? Go to http://lynette-gina.info/. Enter L004 in the search box to learn more about \"Secondhand Smoke: Care Instructions. \" Current as of: May 26, 2016 Content Version: 11.1 © 3997-1151 [employer]. Care instructions adapted under license by [employer] (which disclaims liability or warranty for this information). If you have questions about a medical condition or this instruction, always ask your healthcare professional. [address] any warranty or liability for your use of this information.

## 2017-01-10 NOTE — ED PROVIDER NOTES
Patient is a 42 y.o. male presenting with chest pain. The history is provided by the patient. Chest Pain (Angina)    This is a new problem. The current episode started yesterday (around 8pm, whilst climbnig some stairs at work). The problem has not changed since onset. The pain is associated with exertion. The pain is present in the substernal region. The pain is mild. The quality of the pain is described as pressure-like. The pain does not radiate. Associated symptoms include palpitations and shortness of breath. Pertinent negatives include no abdominal pain, no back pain, no cough, no dizziness, no fever, no headaches, no nausea and no vomiting. He has tried nothing for the symptoms. The treatment provided no relief. Risk factors include family history (pt's BROTHER, dad and GRANDfather all  in mid 36's, from CAD). His past medical history does not include DM or HTN. Pertinent negatives include no cardiac catheterization. Past Medical History:   Diagnosis Date    Other ill-defined conditions(799.89)      kidney stones       History reviewed. No pertinent past surgical history. History reviewed. No pertinent family history. Social History     Social History    Marital status:      Spouse name: N/A    Number of children: N/A    Years of education: N/A     Occupational History    Not on file. Social History Main Topics    Smoking status: Never Smoker    Smokeless tobacco: Not on file    Alcohol use No    Drug use: No    Sexual activity: Not on file     Other Topics Concern    Not on file     Social History Narrative         ALLERGIES: Review of patient's allergies indicates no known allergies. Review of Systems   Constitutional: Positive for fatigue. Negative for chills, fever and unexpected weight change. HENT: Negative for rhinorrhea and sore throat. Eyes: Negative for discharge and redness. Respiratory: Positive for shortness of breath. Negative for cough. Cardiovascular: Positive for chest pain and palpitations. Gastrointestinal: Negative for abdominal pain, diarrhea, nausea and vomiting. Musculoskeletal: Negative for arthralgias and back pain. Skin: Negative for rash. Neurological: Negative for dizziness and headaches. All other systems reviewed and are negative. Vitals:    01/10/17 1810   BP: 110/77   Pulse: 93   Resp: 16   Temp: 98 °F (36.7 °C)   SpO2: 98%   Weight: 90.7 kg (200 lb)   Height: 5' 9\" (1.753 m)            Physical Exam   Constitutional: He is oriented to person, place, and time. He appears well-developed and well-nourished. No distress. HENT:   Head: Normocephalic and atraumatic. Eyes: Conjunctivae and EOM are normal. Pupils are equal, round, and reactive to light. Right eye exhibits no discharge. Left eye exhibits no discharge. No scleral icterus. Neck: Normal range of motion. Neck supple. Cardiovascular: Normal heart sounds. An irregularly irregular rhythm present. Tachycardia present. Exam reveals no gallop. No murmur heard. Pulmonary/Chest: Effort normal and breath sounds normal. No respiratory distress. He has no wheezes. He has no rales. Abdominal: Soft. Bowel sounds are normal. There is no tenderness. There is no guarding. Musculoskeletal: Normal range of motion. He exhibits no edema. Neurological: He is alert and oriented to person, place, and time. He exhibits normal muscle tone. cni 2-12 grossly   Skin: Skin is warm and dry. He is not diaphoretic. Psychiatric: He has a normal mood and affect. His behavior is normal.   Nursing note and vitals reviewed. MDM  Number of Diagnoses or Management Options  Atrial fibrillation with RVR [facility]):   Chest pain, unspecified type:   Diagnosis management comments: Medical decision making note:  New onset a.fib, with RVR, onset pretty confidently 20-22 hours ago.   Shoot for rate control consider ibutilide, after talking to cardiology  Strong h/o CAD in family  Cardiology admitting  This concludes the \"medical decision making note\" part of this emergency department visit note.       ED Course       Procedures

## 2017-01-10 NOTE — ED TRIAGE NOTES
Patient states he noticed chest pressure yesterday. States he has had some shortness of breath that worsened today. States he is unable to go up a flight of stairs due to shortness of breath. Denies n/v. Reports dizziness.

## 2017-01-11 PROBLEM — R07.2 PRECORDIAL PAIN: Status: ACTIVE | Noted: 2017-01-11

## 2017-01-11 PROBLEM — I48.0 PAROXYSMAL ATRIAL FIBRILLATION (HCC): Status: ACTIVE | Noted: 2017-01-11

## 2017-01-11 LAB
APTT PPP: 44.2 SEC (ref 23.5–31.7)
APTT PPP: 60 SEC (ref 23.5–31.7)
ATRIAL RATE: 61 BPM
ATRIAL RATE: 72 BPM
CALCULATED P AXIS, ECG09: 33 DEGREES
CALCULATED P AXIS, ECG09: 63 DEGREES
CALCULATED R AXIS, ECG10: 20 DEGREES
CALCULATED R AXIS, ECG10: 39 DEGREES
CALCULATED T AXIS, ECG11: 49 DEGREES
CALCULATED T AXIS, ECG11: 63 DEGREES
CHOLEST SERPL-MCNC: 124 MG/DL
DIAGNOSIS, 93000: NORMAL
DIAGNOSIS, 93000: NORMAL
DIASTOLIC BP, ECG02: NORMAL MMHG
DIASTOLIC BP, ECG02: NORMAL MMHG
HDLC SERPL-MCNC: 31 MG/DL (ref 40–60)
HDLC SERPL: 4 {RATIO}
LDLC SERPL CALC-MCNC: 80.2 MG/DL
LIPID PROFILE,FLP: ABNORMAL
P-R INTERVAL, ECG05: 138 MS
P-R INTERVAL, ECG05: 144 MS
Q-T INTERVAL, ECG07: 406 MS
Q-T INTERVAL, ECG07: 434 MS
QRS DURATION, ECG06: 84 MS
QRS DURATION, ECG06: 84 MS
QTC CALCULATION (BEZET), ECG08: 436 MS
QTC CALCULATION (BEZET), ECG08: 444 MS
SYSTOLIC BP, ECG01: NORMAL MMHG
SYSTOLIC BP, ECG01: NORMAL MMHG
TRIGL SERPL-MCNC: 64 MG/DL (ref 35–150)
TROPONIN I SERPL-MCNC: <0.02 NG/ML (ref 0.02–0.05)
TROPONIN I SERPL-MCNC: <0.02 NG/ML (ref 0.02–0.05)
VENTRICULAR RATE, ECG03: 61 BPM
VENTRICULAR RATE, ECG03: 72 BPM
VLDLC SERPL CALC-MCNC: 12.8 MG/DL (ref 6–23)

## 2017-01-11 PROCEDURE — 93458 L HRT ARTERY/VENTRICLE ANGIO: CPT

## 2017-01-11 PROCEDURE — 77030004534 HC CATH ANGI DX INFN CARD -A

## 2017-01-11 PROCEDURE — 99152 MOD SED SAME PHYS/QHP 5/>YRS: CPT

## 2017-01-11 PROCEDURE — 93571 IV DOP VEL&/PRESS C FLO 1ST: CPT

## 2017-01-11 PROCEDURE — C8929 TTE W OR WO FOL WCON,DOPPLER: HCPCS

## 2017-01-11 PROCEDURE — G0378 HOSPITAL OBSERVATION PER HR: HCPCS

## 2017-01-11 PROCEDURE — 74011250636 HC RX REV CODE- 250/636

## 2017-01-11 PROCEDURE — C1874 STENT, COATED/COV W/DEL SYS: HCPCS

## 2017-01-11 PROCEDURE — 93005 ELECTROCARDIOGRAM TRACING: CPT | Performed by: INTERNAL MEDICINE

## 2017-01-11 PROCEDURE — 92928 PRQ TCAT PLMT NTRAC ST 1 LES: CPT

## 2017-01-11 PROCEDURE — 74011250636 HC RX REV CODE- 250/636: Performed by: INTERNAL MEDICINE

## 2017-01-11 PROCEDURE — 77030015766

## 2017-01-11 PROCEDURE — 80061 LIPID PANEL: CPT | Performed by: INTERNAL MEDICINE

## 2017-01-11 PROCEDURE — C1725 CATH, TRANSLUMIN NON-LASER: HCPCS

## 2017-01-11 PROCEDURE — 85730 THROMBOPLASTIN TIME PARTIAL: CPT | Performed by: INTERNAL MEDICINE

## 2017-01-11 PROCEDURE — 74011250637 HC RX REV CODE- 250/637: Performed by: INTERNAL MEDICINE

## 2017-01-11 PROCEDURE — 77030029997 HC DEV COM RDL R BND TELE -B

## 2017-01-11 PROCEDURE — 84484 ASSAY OF TROPONIN QUANT: CPT | Performed by: INTERNAL MEDICINE

## 2017-01-11 PROCEDURE — 85347 COAGULATION TIME ACTIVATED: CPT | Performed by: INTERNAL MEDICINE

## 2017-01-11 PROCEDURE — 74011000250 HC RX REV CODE- 250: Performed by: INTERNAL MEDICINE

## 2017-01-11 PROCEDURE — 99218 HC RM OBSERVATION: CPT

## 2017-01-11 PROCEDURE — C1887 CATHETER, GUIDING: HCPCS

## 2017-01-11 PROCEDURE — C1769 GUIDE WIRE: HCPCS

## 2017-01-11 PROCEDURE — 99153 MOD SED SAME PHYS/QHP EA: CPT

## 2017-01-11 PROCEDURE — 74011000258 HC RX REV CODE- 258: Performed by: INTERNAL MEDICINE

## 2017-01-11 PROCEDURE — C1894 INTRO/SHEATH, NON-LASER: HCPCS

## 2017-01-11 PROCEDURE — 36415 COLL VENOUS BLD VENIPUNCTURE: CPT | Performed by: INTERNAL MEDICINE

## 2017-01-11 PROCEDURE — 96366 THER/PROPH/DIAG IV INF ADDON: CPT

## 2017-01-11 RX ORDER — HEPARIN SODIUM 200 [USP'U]/100ML
25 INJECTION, SOLUTION INTRAVENOUS CONTINUOUS
Status: DISCONTINUED | OUTPATIENT
Start: 2017-01-11 | End: 2017-01-11

## 2017-01-11 RX ORDER — PRASUGREL 10 MG/1
10 TABLET, FILM COATED ORAL DAILY
Status: DISCONTINUED | OUTPATIENT
Start: 2017-01-12 | End: 2017-01-12 | Stop reason: HOSPADM

## 2017-01-11 RX ORDER — SODIUM CHLORIDE 9 MG/ML
75 INJECTION, SOLUTION INTRAVENOUS CONTINUOUS
Status: DISCONTINUED | OUTPATIENT
Start: 2017-01-11 | End: 2017-01-11

## 2017-01-11 RX ORDER — HEPARIN SODIUM 10000 [USP'U]/ML
1000-10000 INJECTION, SOLUTION INTRAVENOUS; SUBCUTANEOUS
Status: DISCONTINUED | OUTPATIENT
Start: 2017-01-11 | End: 2017-01-12 | Stop reason: HOSPADM

## 2017-01-11 RX ORDER — SODIUM CHLORIDE 0.9 % (FLUSH) 0.9 %
5-10 SYRINGE (ML) INJECTION AS NEEDED
Status: DISCONTINUED | OUTPATIENT
Start: 2017-01-11 | End: 2017-01-12 | Stop reason: HOSPADM

## 2017-01-11 RX ORDER — LIDOCAINE HYDROCHLORIDE 20 MG/ML
1-20 INJECTION, SOLUTION INFILTRATION; PERINEURAL ONCE
Status: COMPLETED | OUTPATIENT
Start: 2017-01-11 | End: 2017-01-11

## 2017-01-11 RX ORDER — ACETAMINOPHEN 325 MG/1
650 TABLET ORAL
Status: DISCONTINUED | OUTPATIENT
Start: 2017-01-11 | End: 2017-01-12 | Stop reason: HOSPADM

## 2017-01-11 RX ORDER — SODIUM CHLORIDE 0.9 % (FLUSH) 0.9 %
5-10 SYRINGE (ML) INJECTION EVERY 8 HOURS
Status: DISCONTINUED | OUTPATIENT
Start: 2017-01-11 | End: 2017-01-12 | Stop reason: HOSPADM

## 2017-01-11 RX ORDER — NITROGLYCERIN 0.4 MG/1
0.4 TABLET SUBLINGUAL
Status: DISCONTINUED | OUTPATIENT
Start: 2017-01-11 | End: 2017-01-12 | Stop reason: HOSPADM

## 2017-01-11 RX ORDER — PRASUGREL 10 MG/1
60 TABLET, FILM COATED ORAL
Status: COMPLETED | OUTPATIENT
Start: 2017-01-11 | End: 2017-01-11

## 2017-01-11 RX ORDER — METOPROLOL TARTRATE 25 MG/1
25 TABLET, FILM COATED ORAL EVERY 12 HOURS
Status: DISCONTINUED | OUTPATIENT
Start: 2017-01-11 | End: 2017-01-12 | Stop reason: HOSPADM

## 2017-01-11 RX ORDER — MIDAZOLAM HYDROCHLORIDE 1 MG/ML
1-6 INJECTION, SOLUTION INTRAMUSCULAR; INTRAVENOUS
Status: DISCONTINUED | OUTPATIENT
Start: 2017-01-11 | End: 2017-01-12 | Stop reason: HOSPADM

## 2017-01-11 RX ORDER — HEPARIN SODIUM 5000 [USP'U]/ML
35 INJECTION, SOLUTION INTRAVENOUS; SUBCUTANEOUS ONCE
Status: COMPLETED | OUTPATIENT
Start: 2017-01-11 | End: 2017-01-11

## 2017-01-11 RX ORDER — FENTANYL CITRATE 50 UG/ML
25-100 INJECTION, SOLUTION INTRAMUSCULAR; INTRAVENOUS
Status: DISCONTINUED | OUTPATIENT
Start: 2017-01-11 | End: 2017-01-12 | Stop reason: HOSPADM

## 2017-01-11 RX ORDER — GUAIFENESIN 100 MG/5ML
81 LIQUID (ML) ORAL DAILY
Status: DISCONTINUED | OUTPATIENT
Start: 2017-01-11 | End: 2017-01-12 | Stop reason: HOSPADM

## 2017-01-11 RX ORDER — ATORVASTATIN CALCIUM 40 MG/1
80 TABLET, FILM COATED ORAL
Status: DISCONTINUED | OUTPATIENT
Start: 2017-01-11 | End: 2017-01-12 | Stop reason: HOSPADM

## 2017-01-11 RX ORDER — HYDROCODONE BITARTRATE AND ACETAMINOPHEN 5; 325 MG/1; MG/1
1 TABLET ORAL
Status: DISCONTINUED | OUTPATIENT
Start: 2017-01-11 | End: 2017-01-12 | Stop reason: HOSPADM

## 2017-01-11 RX ADMIN — MIDAZOLAM HYDROCHLORIDE 2 MG: 1 INJECTION, SOLUTION INTRAMUSCULAR; INTRAVENOUS at 16:10

## 2017-01-11 RX ADMIN — HEPARIN SODIUM 3000 UNITS: 10000 INJECTION, SOLUTION INTRAVENOUS; SUBCUTANEOUS at 16:45

## 2017-01-11 RX ADMIN — HEPARIN SODIUM AND DEXTROSE 14 UNITS/KG/HR: 5000; 5 INJECTION INTRAVENOUS at 05:58

## 2017-01-11 RX ADMIN — SOTALOL HYDROCHLORIDE 160 MG: 80 TABLET ORAL at 05:54

## 2017-01-11 RX ADMIN — Medication 10 ML: at 21:18

## 2017-01-11 RX ADMIN — HEPARIN SODIUM 2 ML: 10000 INJECTION, SOLUTION INTRAVENOUS; SUBCUTANEOUS at 16:13

## 2017-01-11 RX ADMIN — HEPARIN SODIUM 25 ML/HR: 200 INJECTION, SOLUTION INTRAVENOUS at 15:51

## 2017-01-11 RX ADMIN — PRASUGREL HYDROCHLORIDE 60 MG: 10 TABLET, FILM COATED ORAL at 16:57

## 2017-01-11 RX ADMIN — LIDOCAINE HYDROCHLORIDE 40 MG: 20 INJECTION, SOLUTION INFILTRATION; PERINEURAL at 16:08

## 2017-01-11 RX ADMIN — ATORVASTATIN CALCIUM 80 MG: 40 TABLET, FILM COATED ORAL at 21:18

## 2017-01-11 RX ADMIN — METOPROLOL TARTRATE 25 MG: 25 TABLET ORAL at 21:18

## 2017-01-11 RX ADMIN — HEPARIN SODIUM 4000 UNITS: 10000 INJECTION, SOLUTION INTRAVENOUS; SUBCUTANEOUS at 16:30

## 2017-01-11 RX ADMIN — FENTANYL CITRATE 25 MCG: 50 INJECTION, SOLUTION INTRAMUSCULAR; INTRAVENOUS at 16:10

## 2017-01-11 RX ADMIN — ASPIRIN 81 MG CHEWABLE TABLET 81 MG: 81 TABLET CHEWABLE at 08:37

## 2017-01-11 RX ADMIN — PERFLUTREN 1 ML: 6.52 INJECTION, SUSPENSION INTRAVENOUS at 14:00

## 2017-01-11 RX ADMIN — HEPARIN SODIUM 2950 UNITS: 5000 INJECTION, SOLUTION INTRAVENOUS; SUBCUTANEOUS at 05:56

## 2017-01-11 RX ADMIN — SODIUM CHLORIDE 75 ML/HR: 900 INJECTION, SOLUTION INTRAVENOUS at 08:37

## 2017-01-11 RX ADMIN — ADENOSINE 140 MCG/KG/MIN: 3 INJECTION, SOLUTION INTRAVENOUS at 16:40

## 2017-01-11 RX ADMIN — NITROGLYCERIN 0.15 MG: 200 INJECTION, SOLUTION INTRAVENOUS at 16:54

## 2017-01-11 NOTE — PROGRESS NOTES
Patient discharged back to room 2232 . TR band remains in place on right radial. No hematoma, no bleeding. 14cc air remains in TR band. Chart with patient. All belongings returned to patient. Right radial site assessed at the bedside by Juve Montgomery RN for handoff of care.

## 2017-01-11 NOTE — PROCEDURES
Brief Cardiac Procedure Note    Patient: Elis Lee MRN: [id number]  SSN: [id number]    YOB: 1975  Age: 42 y.o. Sex: male      Date of Procedure: 1/11/2017     Pre-procedure Diagnosis: Unstable angina    Post-procedure Diagnosis: Coronary artery disease    Procedure: Left Heart Catheterization with PCI    Brief Description of Procedure: As above    Performed By: Laurita Becker MD     Assistants: None    Anesthesia: Moderate Sedation    Estimated Blood Loss: Less than 10 mL      Specimens: None    Implants: None    Findings: Obstructive CAD. PCI of pLAD with Synergy 3.5 x 28 mm RUBEN. FFR 0.69 pre-PCI    EF 60%. Complications: None    Recommendations: Continue medical therapy.     Signed By: Laurita Becker MD     January 11, 2017

## 2017-01-11 NOTE — PROGRESS NOTES
Report received from Felipe Rodriguez Dr. Procedural findings communicated. Intra procedural  medication administration reviewed. Progression of care discussed.      Patient received into [address] [facility] 7 post sheath removal.     Access site without bleeding or swelling yes    Dressing dry and intact yes    Patient instructed to limit movement to right upper extremity    Routine post procedural vital signs and site assessment initiated yes

## 2017-01-11 NOTE — PROGRESS NOTES
[facility] CARDIOLOGY PROGRESS NOTE           1/11/2017 8:32 AM    Admit Date: 1/10/2017      Subjective:   Patient back in sinus rhythm this AM.  Received Sotalol 160 this AM. No active chest pain. ROS:  Cardiovascular:  As noted above    Objective:      Vitals:    01/10/17 2300 01/11/17 0327 01/11/17 0554 01/11/17 0655   BP:  102/64 109/72 107/75   Pulse:  (!) 59 62 67   Resp:  18  14   Temp:  98.3 °F (36.8 °C)  99 °F (37.2 °C)   SpO2: 95% 98%  98%   Weight:       Height:           Physical Exam:  General-No Acute Distress  Neck- supple, no JVD  CV- regular rate and rhythm no MRG  Lung- clear bilaterally  Abd- soft, nontender, nondistended  Ext- no edema bilaterally. Skin- warm and dry      Data Review:   Recent Labs      01/11/17   0440  01/10/17   1814   NA   --   144   K   --   4.2   BUN   --   8   CREA   --   0.86   GLU   --   109*   WBC   --   4.8   HGB   --   12.7*   HCT   --   36.2*   PLT   --   278   HDL  31*   --         Lab Results   Component Value Date/Time    TROIQ <0.02 (L) 01/11/2017 04:40 AM    TROIQ <0.02 (L) 01/10/2017 11:40 PM    TNIPOC 0 01/10/2017 06:11 PM       Assessment/Plan:     Active Problems:    Paroxysmal atrial fibrillation ([address] (1/11/2017)  Patient in sinus rhythm. ? Sleep apnea per his history. Will need outpatient sleep study. Stop Sotalol and start lopressor. Start ASA. [id number] score is 0. Precordial pain  Cardiac cath later today.              Eric Estrella MD  1/11/2017 8:32 AM

## 2017-01-11 NOTE — PROGRESS NOTES
IV heparin rate has been adjusted based on the most recent PTT results.     Lab Results   Component Value Date/Time    aPTT 44.2 01/11/2017 04:40 AM

## 2017-01-11 NOTE — PROGRESS NOTES
IV heparin rate has been adjusted based on the most recent PTT results. Lab Results   Component Value Date/Time    aPTT 60.0 01/11/2017 11:56 AM   No change in rate.  Continues at 14 units/kg/hr

## 2017-01-11 NOTE — PROGRESS NOTES
TRANSFER - OUT REPORT:    Verbal report given to Belle/Ghazala saundra Pineda  being transferred to CPRU/CVSD for routine progression of care       Report consisted of patients Situation, Background, Assessment and   Recommendations(SBAR). Information from the following report(s) SBAR, Procedure Summary and MAR was reviewed with the receiving nurse. Opportunity for questions and clarification was provided. Procedure: [facility]   Finding Summary: 1 stent to LAD  Location: R wrist   Closure Device: R band   Post Site Assessment: No bleeding, no hematoma     Intra Procedure Meds:    Versed: 2mg  Fentanyl: 25mcg  Heparin: 7000units    Antiplatelet: 31JK             Peripheral IV 01/10/17 Left Antecubital (Active)   Site Assessment Clean, dry, & intact 1/11/2017  8:37 AM   Phlebitis Assessment 0 1/11/2017  8:37 AM   Infiltration Assessment 0 1/11/2017  8:37 AM   Dressing Status Clean, dry, & intact 1/11/2017  8:37 AM   Dressing Type Transparent;Tape 1/11/2017  8:37 AM   Hub Color/Line Status Pink;Capped;Flushed;Patent 1/11/2017  8:37 AM   Alcohol Cap Used No 1/11/2017  3:29 AM        Post-Procedure Site Assessment (1)  Wound Type: Catheter entry/exit  Location: Radial  Orientation : Right  Closure Device:  (R band at 1700 at 14ml)  Site Assessment: No bleeding, No hematoma                       has No Known Allergies.     Past Medical History   Diagnosis Date    Other ill-defined conditions(799.89)      kidney stones     Visit Vitals    /75 (BP 1 Location: Right arm, BP Patient Position: At rest)    Pulse 69    Temp 97.5 °F (36.4 °C)    Resp 16    Ht 5' 9\" (1.753 m)    Wt 84.8 kg (187 lb)    SpO2 98%    BMI 27.62 kg/m2

## 2017-01-11 NOTE — PROGRESS NOTES
Patient received into lab for left heart cath. Patient was identified using name and date of birth. Pertinent information was reviewed including allergies, history, medications and lab work. Patient states that he has no questions concerning procedure. Signed consent is on chart as well current history and physical. IV access was checked for patency.

## 2017-01-11 NOTE — PROGRESS NOTES
Pt seen and resting in bed. Wife at bedside. Independent of ADL's. Able to get Rx with drug plan. States he has PCP. No DME's at home. No needs voiced at present for d/c needs. Care Management Interventions  PCP Verified by CM: Yes  Mode of Transport at Discharge: Other (see comment)  Current Support Network: Lives with Spouse, Own Home  Confirm Follow Up Transport: Family  Plan discussed with Pt/Family/Caregiver: Yes  Freedom of Choice Offered:  Yes

## 2017-01-11 NOTE — ROUTINE PROCESS
TRANSFER - OUT REPORT:    Verbal report given to receiving RN on Lizzette Sumner  being transferred to room 2232 stepdown  for routine progression of care       Report consisted of patients Situation, Background, Assessment and   Recommendations(SBAR). Information from the following report(s) SBAR, ED Summary and Cardiac Rhythm A-fib was reviewed with the receiving nurse. Lines:   Peripheral IV 01/10/17 Left Antecubital (Active)   Site Assessment Clean, dry, & intact 1/10/2017  6:50 PM   Phlebitis Assessment 0 1/10/2017  6:50 PM   Infiltration Assessment 0 1/10/2017  6:50 PM        Opportunity for questions and clarification was provided.       Patient transported with:   Monitor  Registered Nurse

## 2017-01-11 NOTE — PROGRESS NOTES
Bedside and verbal report from Gurwinder Tenorio RN. TR band intact to right radial site with no bleeding/hematoma observed. 7 ml in band per report. 3 ml removed with no complications. Pt resting quietly in bed, no s/sx distress noted. Respirations even and unlabored on room air. Call light within reach, instructed to call with needs.

## 2017-01-11 NOTE — H&P
[address]   HISTORY AND PHYSICAL       Name:  Jair Lamas   MR#:  [id number]   :  1975   Account #:  [de-identified]   Date of Adm:  01/10/2017       HISTORY: This is a 50-year-old gentleman admitted with new onset   atrial fibrillation and chest pain. He was in his usual state of health until yesterday when he had   the onset of palpitations, exertional fatigue, and shortness of   breath and exertional substernal chest pain consistent with   angina. At work today, the symptoms were persistent and he   finally came to the emergency room for further evaluation. His   EKG showed atrial fibrillation with an accelerated rate. PAST MEDICAL HISTORY: He has no past cardiac history. MEDICATIONS: He is on no chronic medication. FAMILY HISTORY: He has a strong family history for the early   development of coronary artery disease. SOCIAL HISTORY: He is . Nonsmoker. ALLERGIES: NO KNOWN ALLERGIES. REVIEW OF SYSTEMS: Otherwise negative. No fever, chills,   diplopia, tinnitus, epistaxis, dysphagia, abdominal pain or   melena. PHYSICAL EXAMINATION   VITAL SIGNS: His heart rate is 110, irregular. On the monitor,   he is in atrial fibrillation. I believe I saw some intermittent   sinus rhythm. HEENT: Exam is grossly negative. No jaundice or drainage. NECK: Veins are flat. Carotids are negative. Thyroid is not   enlarged. LUNGS: Clear. HEART: Irregular rate and rhythm. There is no murmur, gallop, or   rub. ABDOMEN: Soft. No tenderness. No masses. EXTREMITIES: No edema, cyanosis, clubbing. NEUROLOGIC: Exam is grossly negative. MUSCULOSKELETAL: Exam is grossly negative. GENITAL/RECTAL: Exams not performed. LABORATORY DATA: His EKG shows atrial fibrillation. His lab work is negative. There is a TSH that is borderline   elevated at 4.1. IMPRESSION   1. New atrial fibrillation. 2. New angina. 3. Early family history for coronary disease.     PLAN: Rhythm control. A cardiac catheterization will be   performed as well.         MD BRUNO [place] / MARIS   D:  01/10/2017   21:01   T:  01/10/2017   21:25   Job #:  [id number]

## 2017-01-11 NOTE — PROGRESS NOTES
TRANSFER - IN REPORT:    Verbal report received from Dolly Field RN on Jaspreet Guadarrama  being received from ED for routine progression of care      Report consisted of patients Situation, Background, Assessment and   Recommendations(SBAR). Information from the following report(s) Kardex was reviewed with the receiving nurse. Opportunity for questions and clarification was provided. Assessment completed upon patients arrival to unit and care assumed.

## 2017-01-11 NOTE — PROGRESS NOTES
TRANSFER - IN REPORT:    Verbal report received from Donny Vasquez RN(name) on Morgan Cifuentes  being received from Cath lab(unit) for routine post - op      Report consisted of patients Situation, Background, Assessment and   Recommendations(SBAR). Information from the following report(s) SBAR, Kardex, Procedure Summary, MAR and Recent Results was reviewed with the receiving nurse. Opportunity for questions and clarification was provided. Assessment completed upon patients arrival to unit and care assumed. Dual skin assessment with RN. No allevyn in place. No redness or breakdown noted.  TR band noted to right radial.

## 2017-01-12 VITALS
HEIGHT: 69 IN | TEMPERATURE: 97.8 F | HEART RATE: 62 BPM | RESPIRATION RATE: 16 BRPM | OXYGEN SATURATION: 100 % | WEIGHT: 183.2 LBS | SYSTOLIC BLOOD PRESSURE: 108 MMHG | BODY MASS INDEX: 27.13 KG/M2 | DIASTOLIC BLOOD PRESSURE: 74 MMHG

## 2017-01-12 PROBLEM — I25.110 CORONARY ARTERY DISEASE INVOLVING NATIVE CORONARY ARTERY OF NATIVE HEART WITH UNSTABLE ANGINA PECTORIS (HCC): Status: ACTIVE | Noted: 2017-01-12

## 2017-01-12 LAB
ANION GAP BLD CALC-SCNC: 10 MMOL/L (ref 7–16)
BASOPHILS # BLD AUTO: 0 K/UL (ref 0–0.2)
BASOPHILS # BLD: 0 % (ref 0–2)
BUN SERPL-MCNC: 7 MG/DL (ref 6–23)
CALCIUM SERPL-MCNC: 8 MG/DL (ref 8.3–10.4)
CHLORIDE SERPL-SCNC: 107 MMOL/L (ref 98–107)
CHOLEST SERPL-MCNC: 119 MG/DL
CO2 SERPL-SCNC: 26 MMOL/L (ref 21–32)
CREAT SERPL-MCNC: 0.71 MG/DL (ref 0.8–1.5)
DIFFERENTIAL METHOD BLD: ABNORMAL
EOSINOPHIL # BLD: 0.3 K/UL (ref 0–0.8)
EOSINOPHIL NFR BLD: 7 % (ref 0.5–7.8)
ERYTHROCYTE [DISTWIDTH] IN BLOOD BY AUTOMATED COUNT: 12.8 % (ref 11.9–14.6)
GLUCOSE SERPL-MCNC: 90 MG/DL (ref 65–100)
HCT VFR BLD AUTO: 33.8 % (ref 41.1–50.3)
HDLC SERPL-MCNC: 25 MG/DL (ref 40–60)
HDLC SERPL: 4.8 {RATIO}
HGB BLD-MCNC: 11.8 G/DL (ref 13.6–17.2)
IMM GRANULOCYTES # BLD: 0 K/UL (ref 0–0.5)
IMM GRANULOCYTES NFR BLD AUTO: 0.2 % (ref 0–5)
LDLC SERPL CALC-MCNC: 70 MG/DL
LIPID PROFILE,FLP: ABNORMAL
LYMPHOCYTES # BLD AUTO: 28 % (ref 13–44)
LYMPHOCYTES # BLD: 1.2 K/UL (ref 0.5–4.6)
MAGNESIUM SERPL-MCNC: 2.2 MG/DL (ref 1.8–2.4)
MCH RBC QN AUTO: 42.6 PG (ref 26.1–32.9)
MCHC RBC AUTO-ENTMCNC: 34.9 G/DL (ref 31.4–35)
MCV RBC AUTO: 122 FL (ref 79.6–97.8)
MONOCYTES # BLD: 0.8 K/UL (ref 0.1–1.3)
MONOCYTES NFR BLD AUTO: 19 % (ref 4–12)
NEUTS SEG # BLD: 2 K/UL (ref 1.7–8.2)
NEUTS SEG NFR BLD AUTO: 46 % (ref 43–78)
PLATELET # BLD AUTO: 257 K/UL (ref 150–450)
PMV BLD AUTO: 9.8 FL (ref 10.8–14.1)
POTASSIUM SERPL-SCNC: 4.1 MMOL/L (ref 3.5–5.1)
RBC # BLD AUTO: 2.77 M/UL (ref 4.23–5.67)
SODIUM SERPL-SCNC: 143 MMOL/L (ref 136–145)
TRIGL SERPL-MCNC: 120 MG/DL (ref 35–150)
VLDLC SERPL CALC-MCNC: 24 MG/DL (ref 6–23)
WBC # BLD AUTO: 4.3 K/UL (ref 4.3–11.1)

## 2017-01-12 PROCEDURE — 74011250636 HC RX REV CODE- 250/636: Performed by: INTERNAL MEDICINE

## 2017-01-12 PROCEDURE — 90686 IIV4 VACC NO PRSV 0.5 ML IM: CPT | Performed by: INTERNAL MEDICINE

## 2017-01-12 PROCEDURE — G0378 HOSPITAL OBSERVATION PER HR: HCPCS

## 2017-01-12 PROCEDURE — 99218 HC RM OBSERVATION: CPT

## 2017-01-12 PROCEDURE — 74011250637 HC RX REV CODE- 250/637: Performed by: INTERNAL MEDICINE

## 2017-01-12 PROCEDURE — 36415 COLL VENOUS BLD VENIPUNCTURE: CPT | Performed by: INTERNAL MEDICINE

## 2017-01-12 PROCEDURE — 90471 IMMUNIZATION ADMIN: CPT

## 2017-01-12 PROCEDURE — 80061 LIPID PANEL: CPT | Performed by: INTERNAL MEDICINE

## 2017-01-12 PROCEDURE — 85025 COMPLETE CBC W/AUTO DIFF WBC: CPT | Performed by: INTERNAL MEDICINE

## 2017-01-12 PROCEDURE — 83735 ASSAY OF MAGNESIUM: CPT | Performed by: INTERNAL MEDICINE

## 2017-01-12 PROCEDURE — 80048 BASIC METABOLIC PNL TOTAL CA: CPT | Performed by: INTERNAL MEDICINE

## 2017-01-12 RX ORDER — GUAIFENESIN 100 MG/5ML
81 LIQUID (ML) ORAL DAILY
Qty: 30 TAB | Refills: 0 | Status: SHIPPED
Start: 2017-01-12

## 2017-01-12 RX ORDER — NITROGLYCERIN 0.4 MG/1
0.4 TABLET SUBLINGUAL
Qty: 1 BOTTLE | Refills: 5 | Status: SHIPPED | OUTPATIENT
Start: 2017-01-12 | End: 2017-03-22 | Stop reason: SDUPTHER

## 2017-01-12 RX ORDER — PRASUGREL 10 MG/1
10 TABLET, FILM COATED ORAL DAILY
Qty: 30 TAB | Refills: 11 | Status: SHIPPED | OUTPATIENT
Start: 2017-01-12 | End: 2017-02-20 | Stop reason: SDUPTHER

## 2017-01-12 RX ORDER — METOPROLOL TARTRATE 25 MG/1
25 TABLET, FILM COATED ORAL EVERY 12 HOURS
Qty: 60 TAB | Refills: 11 | Status: SHIPPED | OUTPATIENT
Start: 2017-01-12 | End: 2017-03-22 | Stop reason: SDUPTHER

## 2017-01-12 RX ORDER — ATORVASTATIN CALCIUM 80 MG/1
80 TABLET, FILM COATED ORAL
Qty: 30 TAB | Refills: 11 | Status: SHIPPED | OUTPATIENT
Start: 2017-01-12 | End: 2017-03-22 | Stop reason: SDUPTHER

## 2017-01-12 RX ADMIN — Medication 10 ML: at 06:20

## 2017-01-12 RX ADMIN — METOPROLOL TARTRATE 25 MG: 25 TABLET ORAL at 08:57

## 2017-01-12 RX ADMIN — PRASUGREL HYDROCHLORIDE 10 MG: 10 TABLET, FILM COATED ORAL at 08:57

## 2017-01-12 RX ADMIN — INFLUENZA VIRUS VACCINE 0.5 ML: 15; 15; 15; 15 SUSPENSION INTRAMUSCULAR at 12:48

## 2017-01-12 RX ADMIN — ASPIRIN 81 MG CHEWABLE TABLET 81 MG: 81 TABLET CHEWABLE at 08:57

## 2017-01-12 NOTE — PROGRESS NOTES
Final 4 ml of air removed from TR band with no bleeding or hematoma noted. Sterile dressing applied. Pt instructed not to use right arm and to call as needed for signs of bleeding.

## 2017-01-12 NOTE — PROCEDURES
[facility] 43       Name:  Alan Murillo   MR#:  [id number]   :  1975   Account #:  [de-identified]   Date of Adm:  01/10/2017       PRIMARY CARDIOLOGIST: Dr. Ryan Kern: Chest pain during atrial fibrillation   consistent with unstable angina. Patient with strong family   history of premature coronary artery disease. Cardiac   catheterization arranged by Dr. Izabela Parrish: After informed consent was obtained, the   patient was brought to the cardiac catheterization lab. The   right radial artery was prepped and draped in the usual sterile   fashion. Utilizing modified Seldinger technique and   micropuncture needle, the right radial artery was entered. A 6-  [place]ib[place]ian Terumo Slender sheath was placed without difficulty. A   radial cocktail consisting of 2000 units of heparin, 2 mg of   verapamil, 200 mcg of nitroglycerin was administered. A 5-[place]ib[place]ian   Tiger 4.0 catheter was used to selectively engage the ostium of   the left main coronary artery and right coronary artery   respectively. Selective injections in various projections were   performed. Pigtail catheter was used to cross the aortic valve   and enter the left ventricle. Hemodynamic measurements and left   ventriculogram were obtained. Left ventricular aortic pressure   gradient was obtained by pullback technique. At the conclusion of diagnostic procedure, the patient was   referred for hemodynamic assessment, later PCI of the proximal   LAD. Please see procedure note as below. CONTRAST: Isovue 184 for the diagnostic interventional   procedures. HEMODYNAMIC ASSESSMENT   1. Aortic pressure was 102/61 with a mean of 76.   2. Left ventricular end-diastolic pressure of 13.   3. No significant gradient across the aortic valve. ANGIOGRAPHIC RESULTS    1. Left main coronary artery: Large-caliber vessel.    Angiographically normal.    2. LAD: Significantly diseased in the proximal segment. Up to 70%   stenosis is noted. Distal vessel is patent. 3. First diagonal artery: Small-caliber vessel. Contains a 40% mid   stenosis. 4. Second diagonal artery: Small- to medium-caliber vessel. Patent. 5. Left circumflex: Medium-caliber, dominant vessel. Contains a   30% mid stenosis. 6. First obtuse marginal: Is a medium-caliber vessel. Contains 40%   proximal stenosis. 7. Second obtuse marginal artery: Very small-caliber, 1.5 mm   vessel. 80% proximal stenosis. 8. Left posterolateral branch 1 is a medium-caliber vessel. 20%   proximal stenosis. 9. Left PDA: A medium-caliber vessel. Diffuse 30% proximal   stenosis. 10. Right coronary artery: Medium-caliber, nondominant vessel. 30%   proximal stenosis. 11. Left ventriculogram performed in SUAREZ projection shows normal   left ventricular systolic function, EF 18% to 65%. Aortic root   is nondilated. No mitral regurgitation. CONCLUSION: Multivessel coronary artery disease with what   appears to be a significant proximal LAD stenosis. PLAN: Proceed with hemodynamic assessment of the LAD. FRACTIONAL FLOW RESERVE MEASUREMENT OF THE LEFT ANTERIOR   DESCENDING   TECHNICAL FACTORS: The patient was given additional heparin for   ACT greater than 250. An XB 3.0 guide was used to selectively   engage the ostium of the left main coronary artery. Verrata   pressure wire system was advanced into the ostium of the left   main coronary artery and appropriately normalized. This was then   placed in the mid LAD. IFR measurement was between 0.85 at 0.89. I was somewhat concerned with the readings that were closer to   0.9 and, therefore, I felt intravenous adenosine and fractional   flow reserve measurement was warranted. He was then given   intravenous adenosine at 140 mcg/kg per minute.  There was   significant drop in his fractional flow reserve measurement   to 0.69, indicating the proximal LAD was hemodynamically   significant and likely the cause of his unstable angina in the   setting of atrial fibrillation. Based on the above, it was felt   percutaneous revascularization was warranted. PERCUTANEOUS CORONARY INTERVENTION   LESION: Proximal LAD. Pre-stenosis 70%, with abnormal FFR of   0.69. Post-stenosis 0%. TECHNICAL FACTORS: Using the Verrata wire, the lesion was   predilated with a 3.0 x 20 mm TREK balloon to 12 atmospheres. Following predilatation, a Synergy 3.5 x 28 mm drug-eluting stent   was positioned and deployed at 14 atmospheres. Following stent   deployment, a 3.5 x 15 mm NC balloon was positioned and deployed   on 2 separate occasions to 18 atmospheres. Following   postdilatation, there was excellent angiographic result, with no   residual stenosis. The wire was removed and final orthogonal   projections were obtained after 150 mcg of intracoronary   nitroglycerin. CONCLUSIONS   1. Successful instantaneous fractional flow reserve measurement   and fractional flow reserve measurement of the proximal LAD. The   fractional flow reserve measurement was markedly abnormal at   0.69, indicating hemodynamically significant stenosis. 2. Successful percutaneous coronary intervention and stenting of   proximal left anterior descending with a Synergy 3.5 x 28 mm   drug-eluting stent. PLAN: Ongoing medical therapy. Aggressive risk factor   modification.          MD SHENA Jones / SNOW   D:  01/11/2017   18:59   T:  01/11/2017   19:40   Job #:  [id number]

## 2017-01-12 NOTE — DISCHARGE INSTRUCTIONS
DISCHARGE SUMMARY from Nurse    The following personal items are in your possession at time of discharge:    Dental Appliances: None        Home Medications: None  Jewelry: None  Clothing: With patient sent home with patient  Other Valuables: None             PATIENT INSTRUCTIONS:    After general anesthesia or intravenous sedation, for 24 hours or while taking prescription Narcotics:  · Limit your activities  · Do not drive and operate hazardous machinery  · Do not make important personal or business decisions  · Do  not drink alcoholic beverages  · If you have not urinated within 8 hours after discharge, please contact your surgeon on call. Report the following to your surgeon:  · Excessive pain, swelling, redness or odor of or around the surgical area  · Temperature over 100.5  · Nausea and vomiting lasting longer than 4 hours or if unable to take medications  · Any signs of decreased circulation or nerve impairment to extremity: change in color, persistent  numbness, tingling, coldness or increase pain  · Any questions        *  Please give a list of your current medications to your Primary Care Provider. *  Please update this list whenever your medications are discontinued, doses are      changed, or new medications (including over-the-counter products) are added. *  Please carry medication information at all times in case of emergency situations. These are general instructions for a healthy lifestyle:    No smoking/ No tobacco products/ Avoid exposure to second hand smoke    Surgeon General's Warning:  Quitting smoking now greatly reduces serious risk to your health.     Obesity, smoking, and sedentary lifestyle greatly increases your risk for illness    A healthy diet, regular physical exercise & weight monitoring are important for maintaining a healthy lifestyle    You may be retaining fluid if you have a history of heart failure or if you experience any of the following symptoms:  Weight gain of 3 pounds or more overnight or 5 pounds in a week, increased swelling in our hands or feet or shortness of breath while lying flat in bed. Please call your doctor as soon as you notice any of these symptoms; do not wait until your next office visit. Recognize signs and symptoms of STROKE:    F-face looks uneven    A-arms unable to move or move unevenly    S-speech slurred or non-existent    T-time-call 911 as soon as signs and symptoms begin-DO NOT go       Back to bed or wait to see if you get better-TIME IS BRAIN. Warning Signs of HEART ATTACK     Call 911 if you have these symptoms:   Chest discomfort. Most heart attacks involve discomfort in the center of the chest that lasts more than a few minutes, or that goes away and comes back. It can feel like uncomfortable pressure, squeezing, fullness, or pain.  Discomfort in other areas of the upper body. Symptoms can include pain or discomfort in one or both arms, the back, neck, jaw, or stomach.  Shortness of breath with or without chest discomfort.  Other signs may include breaking out in a cold sweat, nausea, or lightheadedness. Don't wait more than five minutes to call 911 - MINUTES MATTER! Fast action can save your life. Calling 911 is almost always the fastest way to get lifesaving treatment. Emergency Medical Services staff can begin treatment when they arrive -- up to an hour sooner than if someone gets to the hospital by car. The discharge information has been reviewed with the patient and spouse. The patient and spouse verbalized understanding. Discharge medications reviewed with the patient and spouse and appropriate educational materials and side effects teaching were provided. Learning About Coronary Artery Disease (CAD)  What is coronary artery disease? Coronary artery disease (CAD) occurs when plaque builds up in the arteries that bring oxygen-rich blood to your heart.  Plaque is a fatty substance made of cholesterol, calcium, and other substances in the blood. This process is called hardening of the arteries, or atherosclerosis. What happens when you have coronary artery disease? · Plaque may narrow the coronary arteries. Narrowed arteries cause poor blood flow. This can lead to angina symptoms such as chest pain or discomfort. If blood flow is completely blocked, you could have a heart attack. · You can slow CAD and reduce the risk of future problems by making changes in your lifestyle. These include quitting smoking and eating heart-healthy foods. · Treatments for CAD, along with changes in your lifestyle, can help you live a longer and healthier life. How can you prevent coronary artery disease? · Do not smoke. It may be the best thing you can do to prevent heart disease. If you need help quitting, talk to your doctor about stop-smoking programs and medicines. These can increase your chances of quitting for good. · Be active. Get at least 30 minutes of exercise on most days of the week. Walking is a good choice. You also may want to do other activities, such as running, swimming, cycling, or playing tennis or team sports. · Eat heart-healthy foods. Eat more fruits and vegetables and less foods that contain saturated and trans fats. Limit alcohol, sodium, and sweets. · Stay at a healthy weight. Lose weight if you need to. · Manage other health problems such as diabetes, high blood pressure, and high cholesterol. · Manage stress. Stress can hurt your heart. To keep stress low, talk about your problems and feelings. Don't keep your feelings hidden. · If you have talked about it with your doctor, take a low-dose aspirin every day. Aspirin can help certain people lower their risk of a heart attack or stroke. But taking aspirin isn't right for everyone, because it can cause serious bleeding. Do not start taking daily aspirin unless your doctor knows about it.   How is coronary artery disease treated? · Your doctor will suggest that you make lifestyle changes. For example, your doctor may ask you to eat healthy foods, quit smoking, lose extra weight, and be more active. · You will have to take medicines. · Your doctor may suggest a procedure to open narrowed or blocked arteries. This is called angioplasty. Or your doctor may suggest using healthy blood vessels to create detours around narrowed or blocked arteries. This is called bypass surgery. Follow-up care is a key part of your treatment and safety. Be sure to make and go to all appointments, and call your doctor if you are having problems. It's also a good idea to know your test results and keep a list of the medicines you take. Where can you learn more? Go to http://lynette-gina.info/. Enter ([phone] in the search box to learn more about \"Learning About Coronary Artery Disease (CAD). \"  Current as of: January 27, 2016  Content Version: 11.1  © 8981-8518 [employer]. Care instructions adapted under license by [employer] (which disclaims liability or warranty for this information). If you have questions about a medical condition or this instruction, always ask your healthcare professional. [place] [address] any warranty or liability for your use of this information. Percutaneous Coronary Intervention: What to Expect at [facility]    Percutaneous coronary intervention (PCI) is the name for procedures that are used to open a narrowed or blocked coronary artery. The two most common PCI procedures are coronary angioplasty and coronary stent placement. Your groin or arm may have a bruise and feel sore for a day or two after a percutaneous coronary intervention (PCI). You can do light activities around the house, but nothing strenuous for several days. This care sheet gives you a general idea about how long it will take for you to recover.  But each person recovers at a different pace. Follow the steps below to get better as quickly as possible. How can you care for yourself at home? Activity  · Do not do strenuous exercise and do not lift, pull, or push anything heavy until your doctor says it is okay. This may be for a day or two. You can walk around the house and do light activity, such as cooking. · You may shower 24 to 48 hours after the procedure, if your doctor okays it. Pat the incision dry. Do not take a bath for 1 week, or until your doctor tells you it is okay. · If the catheter was placed in your groin, try not to walk up stairs for the first couple of days. · If the catheter was placed in your arm near your wrist, do not bend your wrist deeply for the first couple of days. Be careful using your hand to get into and out of a chair or bed. · If your doctor recommends it, get more exercise. Walking is a good choice. Bit by bit, increase the amount you walk every day. Try for at least 30 minutes on most days of the week. Diet  · Drink plenty of fluids to help your body flush out the dye. If you have kidney, heart, or liver disease and have to limit fluids, talk with your doctor before you increase the amount of fluids you drink. · Keep eating a heart-healthy diet that has lots of fruits, vegetables, and whole grains. If you have not been eating this way, talk to your doctor. You also may want to talk to a dietitian. This expert can help you to learn about healthy foods and plan meals. Medicines  · Your doctor will tell you if and when you can restart your medicines. He or she will also give you instructions about taking any new medicines. · If you take blood thinners, such as warfarin (Coumadin), clopidogrel (Plavix), or aspirin, be sure to talk to your doctor. He or she will tell you if and when to start taking those medicines again. Make sure that you understand exactly what your doctor wants you to do. · Your doctor will prescribe blood-thinning medicines.  You will likely take aspirin plus another antiplatelet, such as clopidogrel (Plavix). It is very important that you take these medicines exactly as directed. These medicines help keep the coronary artery open and reduce your risk of a heart attack. · Call your doctor if you think you are having a problem with your medicine. Care of the catheter site  · For 1 or 2 days, keep a bandage over the spot where the catheter was inserted. The bandage probably will fall off in this time. · Put ice or a cold pack on the area for 10 to 20 minutes at a time to help with soreness or swelling. Put a thin cloth between the ice and your skin. Follow-up care is a key part of your treatment and safety. Be sure to make and go to all appointments, and call your doctor if you are having problems. It's also a good idea to know your test results and keep a list of the medicines you take. When should you call for help? Call 911 anytime you think you may need emergency care. For example, call if:  · You passed out (lost consciousness). · You have severe trouble breathing. · You have sudden chest pain and shortness of breath, or you cough up blood. · You have symptoms of a heart attack, such as:  ¨ Chest pain or pressure. ¨ Sweating. ¨ Shortness of breath. ¨ Nausea or vomiting. ¨ Pain that spreads from the chest to the neck, jaw, or one or both shoulders or arms. ¨ Dizziness or lightheadedness. ¨ A fast or uneven pulse. After calling 911, chew 1 adult-strength aspirin. Wait for an ambulance. Do not try to drive yourself. · You have been diagnosed with angina, and you have angina symptoms that do not go away with rest or are not getting better within 5 minutes after you take one dose of nitroglycerin. Call your doctor now or seek immediate medical care if:  · You are bleeding from the area where the catheter was put in your artery. · You have a fast-growing, painful lump at the catheter site.   · You have signs of infection, such as:  ¨ Increased pain, swelling, warmth, or redness. ¨ Red streaks leading from the catheter site. ¨ Pus draining from the catheter site. ¨ A fever. · Your leg or arm looks blue or feels cold, numb, or tingly. Watch closely for changes in your health, and be sure to contact your doctor if you have any problems. Where can you learn more? Go to http://lynette-gina.info/. Enter Z894 in the search box to learn more about \"Percutaneous Coronary Intervention: What to Expect at Home. \"  Current as of: January 27, 2016  Content Version: 11.1  © 4739-0991 [employer]. Care instructions adapted under license by [email or web address] (which disclaims liability or warranty for this information). If you have questions about a medical condition or this instruction, always ask your healthcare professional. [address] any warranty or liability for your use of this information.

## 2017-01-12 NOTE — PROGRESS NOTES
Bedside report to Barb Roper RN. Pt with 6 beat run of V-tach overnight. Notified Dr. Destiny Matos this AM. NSR to Sinus caroline overnight otherwise. Pt resting quietly, respirations even and unlabored. No acute distress noted. Call light within reach, instructed to call for assistance.

## 2017-01-12 NOTE — PROGRESS NOTES
Patient ambulated in godfrey, gate steady. No runs of vtach. Patient in NSR with HR in the 80S. Patient stable.

## 2017-01-12 NOTE — PROGRESS NOTES
Reviewed discharge instructions and medications with patient and gave copies and prescriptions. Gave time for questions. Removed PIV and heart monitor. Escorted downstairs by w/c to home. Patient stable upon discharge.

## 2017-01-12 NOTE — PROGRESS NOTES
[facility] CARDIOLOGY PROGRESS NOTE           1/12/2017 6:52 AM    Admit Date: 1/10/2017      Subjective:   Patient remains in [facility] Has 6 beat runs NSVT that was asymptomatic. No CP or dizziness. ROS:  Cardiovascular:  As noted above    Objective:      Vitals:    01/11/17 1842 01/11/17 1900 01/11/17 2241 01/12/17 0500   BP: 132/79 123/80 99/54 108/64   Pulse: 75 74 77 62   Resp:  16 16 16   Temp:  97.7 °F (36.5 °C) 97.9 °F (36.6 °C) 98.3 °F (36.8 °C)   SpO2:  98% 97% 99%   Weight:    83.1 kg (183 lb 3.2 oz)   Height:           Physical Exam:  General-No Acute Distress  Neck- supple, no JVD  CV- regular rate and rhythm no MRG  Lung- clear bilaterally  Abd- soft, nontender, nondistended  Ext- no edema bilaterally. Skin- warm and dry    Data Review:   Recent Labs      01/12/17   0335  01/11/17   0440  01/10/17   2340  01/10/17   1814   NA  143   --    --   144   K  4.1   --    --   4.2   MG  2.2   --    --    --    BUN  7   --    --   8   CREA  0.71*   --    --   0.86   GLU  90   --    --   109*   WBC  4.3   --    --   4.8   HGB  11.8*   --    --   12.7*   HCT  33.8*   --    --   36.2*   PLT  257   --    --   278   TROIQ   --   <0.02*  <0.02*   --    CHOL  119  124   --    --    TGL  120  64   --    --    LDLC  70  80.2   --    --    HDL  25*  31*   --    --        Assessment/Plan:     Active Problems:    Paroxysmal atrial fibrillation (HCC) (1/11/2017)  - In SR. No recurrence. DC'ed sotalol. On ASA and Effient. No OAC as ChadsVasc=0      Precordial pain (1/11/2017)      Coronary artery disease involving native coronary artery of native heart with unstable angina pectoris ([address] (1/12/2017) - S/P PCI LAD. On ASA and Effient      Dyslipidemia - On atorvastatin     NSVT - Monitor this AM.  EF normal.  If no recurrence cabn DC home will close outpatient follow up.             Gabbie Abraham MD  1/12/2017 6:52 AM

## 2017-01-27 LAB
ACT BLD: 172 SECS (ref 79–138)
ACT BLD: 204 SECS (ref 79–138)
ACT BLD: 209 SECS (ref 79–138)

## 2017-10-26 ENCOUNTER — HOSPITAL ENCOUNTER (OUTPATIENT)
Dept: LAB | Age: 42
Discharge: HOME | End: 2017-10-26
Payer: COMMERCIAL

## 2017-10-26 DIAGNOSIS — I48.0 PAROXYSMAL ATRIAL FIBRILLATION (HCC): ICD-10-CM

## 2017-10-26 DIAGNOSIS — I25.110 CORONARY ARTERY DISEASE INVOLVING NATIVE CORONARY ARTERY OF NATIVE HEART WITH UNSTABLE ANGINA PECTORIS (HCC): ICD-10-CM

## 2017-10-26 LAB
ALT SERPL-CCNC: 142 U/L (ref 12–65)
AST SERPL-CCNC: 96 U/L (ref 15–37)
CHOLEST SERPL-MCNC: 110 MG/DL
HDLC SERPL-MCNC: 38 MG/DL (ref 40–60)
HDLC SERPL: 2.9 {RATIO}
LDLC SERPL CALC-MCNC: 59 MG/DL
LIPID PROFILE,FLP: ABNORMAL
TRIGL SERPL-MCNC: 65 MG/DL (ref 35–150)
VLDLC SERPL CALC-MCNC: 13 MG/DL (ref 6–23)

## 2017-10-26 PROCEDURE — 36415 COLL VENOUS BLD VENIPUNCTURE: CPT | Performed by: INTERNAL MEDICINE

## 2017-10-26 PROCEDURE — 84450 TRANSFERASE (AST) (SGOT): CPT | Performed by: INTERNAL MEDICINE

## 2017-10-26 PROCEDURE — 84460 ALANINE AMINO (ALT) (SGPT): CPT | Performed by: INTERNAL MEDICINE

## 2017-10-26 PROCEDURE — 80061 LIPID PANEL: CPT | Performed by: INTERNAL MEDICINE

## 2018-02-21 ENCOUNTER — HOSPITAL ENCOUNTER (OUTPATIENT)
Dept: LAB | Age: 43
Discharge: HOME | End: 2018-02-21
Payer: COMMERCIAL

## 2018-02-21 DIAGNOSIS — I25.110 CORONARY ARTERY DISEASE INVOLVING NATIVE CORONARY ARTERY OF NATIVE HEART WITH UNSTABLE ANGINA PECTORIS (HCC): ICD-10-CM

## 2018-02-21 LAB
ALBUMIN SERPL-MCNC: 3.7 G/DL (ref 3.5–5)
ALBUMIN/GLOB SERPL: 1 {RATIO}
ALP SERPL-CCNC: 106 U/L (ref 50–136)
ALT SERPL-CCNC: 25 U/L (ref 12–65)
AST SERPL-CCNC: 24 U/L (ref 15–37)
BILIRUB DIRECT SERPL-MCNC: 0.4 MG/DL
BILIRUB SERPL-MCNC: 1.9 MG/DL (ref 0.2–1.1)
CHOLEST SERPL-MCNC: 146 MG/DL
GLOBULIN SER CALC-MCNC: 3.7 G/DL
HDLC SERPL-MCNC: 44 MG/DL (ref 40–60)
HDLC SERPL: 3.3 {RATIO}
LDLC SERPL CALC-MCNC: 87 MG/DL
LIPID PROFILE,FLP: NORMAL
PROT SERPL-MCNC: 7.4 G/DL (ref 6.3–8.2)
TRIGL SERPL-MCNC: 75 MG/DL (ref 35–150)
VLDLC SERPL CALC-MCNC: 15 MG/DL (ref 6–23)

## 2018-02-21 PROCEDURE — 36415 COLL VENOUS BLD VENIPUNCTURE: CPT | Performed by: INTERNAL MEDICINE

## 2018-02-21 PROCEDURE — 80061 LIPID PANEL: CPT | Performed by: INTERNAL MEDICINE

## 2018-02-21 PROCEDURE — 80076 HEPATIC FUNCTION PANEL: CPT | Performed by: INTERNAL MEDICINE

## 2019-10-24 ENCOUNTER — HOSPITAL ENCOUNTER (OUTPATIENT)
Dept: LAB | Age: 44
Discharge: HOME | End: 2019-10-24
Attending: INTERNAL MEDICINE
Payer: COMMERCIAL

## 2019-10-24 DIAGNOSIS — I25.110 CORONARY ARTERY DISEASE INVOLVING NATIVE CORONARY ARTERY OF NATIVE HEART WITH UNSTABLE ANGINA PECTORIS (HCC): ICD-10-CM

## 2019-10-24 LAB
ALBUMIN SERPL-MCNC: 4 G/DL (ref 3.5–5)
ALBUMIN/GLOB SERPL: 1 {RATIO} (ref 1.2–3.5)
ALP SERPL-CCNC: 105 U/L (ref 50–136)
ALT SERPL-CCNC: 20 U/L (ref 12–65)
AST SERPL-CCNC: 23 U/L (ref 15–37)
BILIRUB DIRECT SERPL-MCNC: 0.3 MG/DL
BILIRUB SERPL-MCNC: 1.9 MG/DL (ref 0.2–1.1)
CHOLEST SERPL-MCNC: 139 MG/DL
GLOBULIN SER CALC-MCNC: 3.9 G/DL (ref 2.3–3.5)
HDLC SERPL-MCNC: 29 MG/DL (ref 40–60)
HDLC SERPL: 4.8 {RATIO}
LDLC SERPL CALC-MCNC: 56.2 MG/DL
LIPID PROFILE,FLP: ABNORMAL
PROT SERPL-MCNC: 7.9 G/DL (ref 6.3–8.2)
TRIGL SERPL-MCNC: 269 MG/DL (ref 35–150)
VLDLC SERPL CALC-MCNC: 53.8 MG/DL (ref 6–23)

## 2019-10-24 PROCEDURE — 80061 LIPID PANEL: CPT

## 2019-10-24 PROCEDURE — 80076 HEPATIC FUNCTION PANEL: CPT

## 2019-10-24 PROCEDURE — 36415 COLL VENOUS BLD VENIPUNCTURE: CPT

## 2020-01-22 ENCOUNTER — HOSPITAL ENCOUNTER (OUTPATIENT)
Dept: LAB | Age: 45
Discharge: HOME | End: 2020-01-22
Attending: INTERNAL MEDICINE
Payer: COMMERCIAL

## 2020-01-22 DIAGNOSIS — I25.110 CORONARY ARTERY DISEASE INVOLVING NATIVE CORONARY ARTERY OF NATIVE HEART WITH UNSTABLE ANGINA PECTORIS (HCC): ICD-10-CM

## 2020-01-22 LAB
ALBUMIN SERPL-MCNC: 3.8 G/DL (ref 3.5–5)
ALBUMIN/GLOB SERPL: 0.9 {RATIO} (ref 1.2–3.5)
ALP SERPL-CCNC: 103 U/L (ref 50–136)
ALT SERPL-CCNC: 22 U/L (ref 12–65)
AST SERPL-CCNC: 19 U/L (ref 15–37)
BILIRUB DIRECT SERPL-MCNC: 0.4 MG/DL
BILIRUB SERPL-MCNC: 1.8 MG/DL (ref 0.2–1.1)
CHOLEST SERPL-MCNC: 106 MG/DL
GLOBULIN SER CALC-MCNC: 4.4 G/DL (ref 2.3–3.5)
HDLC SERPL-MCNC: 33 MG/DL (ref 40–60)
HDLC SERPL: 3.2 {RATIO}
LDLC SERPL CALC-MCNC: 61.2 MG/DL
LIPID PROFILE,FLP: ABNORMAL
PROT SERPL-MCNC: 8.2 G/DL (ref 6.3–8.2)
TRIGL SERPL-MCNC: 59 MG/DL (ref 35–150)
VLDLC SERPL CALC-MCNC: 11.8 MG/DL (ref 6–23)

## 2020-01-22 PROCEDURE — 36415 COLL VENOUS BLD VENIPUNCTURE: CPT

## 2020-01-22 PROCEDURE — 80061 LIPID PANEL: CPT

## 2020-01-22 PROCEDURE — 80076 HEPATIC FUNCTION PANEL: CPT

## 2020-09-21 ENCOUNTER — HOSPITAL ENCOUNTER (OUTPATIENT)
Age: 45
Setting detail: OBSERVATION
LOS: 1 days | Discharge: HOME | End: 2020-09-22
Attending: INTERNAL MEDICINE | Admitting: INTERNAL MEDICINE
Payer: COMMERCIAL

## 2020-09-21 PROBLEM — R07.9 CHEST PAIN: Status: ACTIVE | Noted: 2020-09-21

## 2020-09-21 PROBLEM — D64.9 ANEMIA: Status: ACTIVE | Noted: 2020-09-21

## 2020-09-21 PROBLEM — Z86.79 HISTORY OF ATRIAL FIBRILLATION: Status: ACTIVE | Noted: 2020-09-21

## 2020-09-21 PROBLEM — D53.9 MACROCYTIC ANEMIA: Status: ACTIVE | Noted: 2020-09-21

## 2020-09-21 LAB
ATRIAL RATE: 75 BPM
BASOPHILS # BLD: 0 K/UL (ref 0–0.2)
BASOPHILS NFR BLD: 0 % (ref 0–2)
CALCULATED P AXIS, ECG09: 43 DEGREES
CALCULATED R AXIS, ECG10: 27 DEGREES
CALCULATED T AXIS, ECG11: 46 DEGREES
DIAGNOSIS, 93000: NORMAL
DIFFERENTIAL METHOD BLD: ABNORMAL
EOSINOPHIL # BLD: 0.2 K/UL (ref 0–0.8)
EOSINOPHIL NFR BLD: 7 % (ref 0.5–7.8)
ERYTHROCYTE [DISTWIDTH] IN BLOOD BY AUTOMATED COUNT: 24.1 % (ref 11.9–14.6)
HCT VFR BLD AUTO: 21.1 % (ref 41.1–50.3)
HGB BLD-MCNC: 7.3 G/DL (ref 13.6–17.2)
HISTORY CHECKED?,CKHIST: NORMAL
IMM GRANULOCYTES # BLD AUTO: 0 K/UL (ref 0–0.5)
IMM GRANULOCYTES NFR BLD AUTO: 1 % (ref 0–5)
LYMPHOCYTES # BLD: 1.3 K/UL (ref 0.5–4.6)
LYMPHOCYTES NFR BLD: 40 % (ref 13–44)
MCH RBC QN AUTO: 41.5 PG (ref 26.1–32.9)
MCHC RBC AUTO-ENTMCNC: 34.6 G/DL (ref 31.4–35)
MCV RBC AUTO: 119.9 FL (ref 79.6–97.8)
MONOCYTES # BLD: 0.1 K/UL (ref 0.1–1.3)
MONOCYTES NFR BLD: 4 % (ref 4–12)
NEUTS SEG # BLD: 1.6 K/UL (ref 1.7–8.2)
NEUTS SEG NFR BLD: 48 % (ref 43–78)
NRBC # BLD: 0.07 K/UL (ref 0–0.2)
P-R INTERVAL, ECG05: 144 MS
PLATELET # BLD AUTO: 129 K/UL (ref 150–450)
PMV BLD AUTO: 12 FL (ref 9.4–12.3)
Q-T INTERVAL, ECG07: 406 MS
QRS DURATION, ECG06: 84 MS
QTC CALCULATION (BEZET), ECG08: 453 MS
RBC # BLD AUTO: 1.76 M/UL (ref 4.23–5.6)
VENTRICULAR RATE, ECG03: 75 BPM
WBC # BLD AUTO: 3.4 K/UL (ref 4.3–11.1)

## 2020-09-21 PROCEDURE — 86920 COMPATIBILITY TEST SPIN: CPT

## 2020-09-21 PROCEDURE — 85025 COMPLETE CBC W/AUTO DIFF WBC: CPT

## 2020-09-21 PROCEDURE — 36430 TRANSFUSION BLD/BLD COMPNT: CPT

## 2020-09-21 PROCEDURE — 93306 TTE W/DOPPLER COMPLETE: CPT

## 2020-09-21 PROCEDURE — 93005 ELECTROCARDIOGRAM TRACING: CPT | Performed by: NURSE PRACTITIONER

## 2020-09-21 PROCEDURE — 74011250637 HC RX REV CODE- 250/637: Performed by: NURSE PRACTITIONER

## 2020-09-21 PROCEDURE — 99218 HC RM OBSERVATION: CPT

## 2020-09-21 PROCEDURE — 2709999900 HC NON-CHARGEABLE SUPPLY

## 2020-09-21 PROCEDURE — 36415 COLL VENOUS BLD VENIPUNCTURE: CPT

## 2020-09-21 PROCEDURE — P9016 RBC LEUKOCYTES REDUCED: HCPCS

## 2020-09-21 PROCEDURE — 86900 BLOOD TYPING SEROLOGIC ABO: CPT

## 2020-09-21 RX ORDER — ACETAMINOPHEN 325 MG/1
650 TABLET ORAL
Status: DISCONTINUED | OUTPATIENT
Start: 2020-09-21 | End: 2020-09-22 | Stop reason: HOSPADM

## 2020-09-21 RX ORDER — MORPHINE SULFATE 2 MG/ML
2 INJECTION, SOLUTION INTRAMUSCULAR; INTRAVENOUS
Status: DISCONTINUED | OUTPATIENT
Start: 2020-09-21 | End: 2020-09-22 | Stop reason: HOSPADM

## 2020-09-21 RX ORDER — METOPROLOL SUCCINATE 25 MG/1
25 TABLET, EXTENDED RELEASE ORAL DAILY
Status: DISCONTINUED | OUTPATIENT
Start: 2020-09-22 | End: 2020-09-22 | Stop reason: HOSPADM

## 2020-09-21 RX ORDER — SODIUM CHLORIDE 9 MG/ML
250 INJECTION, SOLUTION INTRAVENOUS AS NEEDED
Status: DISCONTINUED | OUTPATIENT
Start: 2020-09-21 | End: 2020-09-22 | Stop reason: HOSPADM

## 2020-09-21 RX ORDER — ATORVASTATIN CALCIUM 20 MG/1
20 TABLET, FILM COATED ORAL
Status: DISCONTINUED | OUTPATIENT
Start: 2020-09-21 | End: 2020-09-22 | Stop reason: HOSPADM

## 2020-09-21 RX ORDER — NITROGLYCERIN 0.4 MG/1
0.4 TABLET SUBLINGUAL
Status: DISCONTINUED | OUTPATIENT
Start: 2020-09-21 | End: 2020-09-22 | Stop reason: HOSPADM

## 2020-09-21 RX ORDER — HYDROCODONE BITARTRATE AND ACETAMINOPHEN 5; 325 MG/1; MG/1
1 TABLET ORAL
Status: DISCONTINUED | OUTPATIENT
Start: 2020-09-21 | End: 2020-09-22 | Stop reason: HOSPADM

## 2020-09-21 RX ORDER — SODIUM CHLORIDE 0.9 % (FLUSH) 0.9 %
5-40 SYRINGE (ML) INJECTION AS NEEDED
Status: DISCONTINUED | OUTPATIENT
Start: 2020-09-21 | End: 2020-09-22 | Stop reason: HOSPADM

## 2020-09-21 RX ADMIN — ATORVASTATIN CALCIUM 20 MG: 20 TABLET, FILM COATED ORAL at 21:51

## 2020-09-21 RX ADMIN — Medication 10 ML: at 21:51

## 2020-09-21 NOTE — CONSULTS
[facility] Consult Note       Consulting GI Physician: Dr. Meaghan Nelson ([facility] patient to [facility])    Referring Provider:  Konrad Pettit NP    Consult Date:  9/21/2020    Admit Date:  9/21/2020    Chief Complaint:  Macrocytic anemia    Subjective:     History of Present Illness:  Patient is a 54 y.o. male with PMH of CAD (PCI of LAD in 2017), pAF (transient, no reoccurrence, not on [address]) and HTN, being seen in GI consultation as a new patient at the request of Cardiology/Cory Man NP for Anemia. He presented to [facility] ED 9/21/20 with c/o chest pain, worsening shortness of breath x1mo, and fatigue. Then labs showed macrocytic anemia with a decreased Hgb 6.9,  with low WBC 3.0, normal platelets,BUN/Cr, iron 165, iron sat 82, TIBC 201, low vit B12. Folate 13.1. No ferritin. He was heme negative on examination. Tbili was up at Troponin was 0.02, TSH 7.095, Free T4 0.65. Patient was transferred to [facility] for further treatment. He has denied overt GI bleeding, hematuria, bruising or other source of blood loss. He has denied heavy alcohol use. Cardiology is holding ASA for now and checking Echocardiogram.  GI and Hematology are consulted for anemia. Patient denies any prior history of anemia. No prior EGD or colonoscopy. No known family history of IBD or colon cancer. PMH:  Past Medical History:   Diagnosis Date    Coronary artery disease involving native coronary artery of native heart with unstable angina pectoris ([facility] [address] 1/12/2017    Other ill-defined conditions(799.89)     kidney stones       PSH:  No past surgical history on file. Allergies:  No Known Allergies    Home Medications:  Prior to Admission medications    Medication Sig Start Date End Date Taking? Authorizing Provider   ezetimibe-simvastatin (VYTORIN) 10-20 mg per tablet Take 1 Tab by mouth nightly.  10/23/19  Yes Lauren Rodriguez MD   metoprolol succinate (TOPROL-XL) 25 mg XL tablet Take 1 Tab by mouth daily. Patient taking differently: Take 25 mg by mouth every other day. 10/23/19  Yes Adriana Lee MD   aspirin 81 mg chewable tablet Take 1 Tab by mouth daily. 1/12/17  Yes Unique WARD NP   nitroglycerin (NITROSTAT) 0.4 mg SL tablet 1 Tab by SubLINGual route every five (5) minutes as needed for Chest Pain. 3/22/17   Adriana Lee MD       Hospital Medications:  Current Facility-Administered Medications   Medication Dose Route Frequency    [START ON 9/22/2020] metoprolol succinate (TOPROL-XL) XL tablet 25 mg  25 mg Oral DAILY    nitroglycerin (NITROSTAT) tablet 0.4 mg  0.4 mg SubLINGual Q5MIN PRN    atorvastatin (LIPITOR) tablet 20 mg  20 mg Oral QHS    sodium chloride (NS) flush 5-40 mL  5-40 mL IntraVENous PRN    acetaminophen (TYLENOL) tablet 650 mg  650 mg Oral Q4H PRN    HYDROcodone-acetaminophen (NORCO) 5-325 mg per tablet 1 Tab  1 Tab Oral Q4H PRN    morphine injection 2 mg  2 mg IntraVENous Q4H PRN    0.9% sodium chloride infusion 250 mL  250 mL IntraVENous PRN       Social History:  Social History     Tobacco Use    Smoking status: Never Smoker    Smokeless tobacco: Never Used   Substance Use Topics    Alcohol use: No       Pt denies any history of drug use, blood transfusions, or tattoos. Family History:  Family History   Problem Relation Age of Onset    No Known Problems Mother     Heart Disease Father     Heart Disease Brother     Heart Disease Paternal Grandfather        Review of Systems:  A detailed 10 system ROS is obtained, with pertinent positives as listed above. All others are negative. Diet:  Cardiac regular    Objective:     Physical Exam:  Vitals:  Visit Vitals  BP (!) 148/91   Pulse 84   Temp 98.8 °F (37.1 °C)   Resp 18   Ht 5' 9\" (1.753 m)   Wt 113.4 kg (250 lb)   SpO2 100%   BMI 36.92 kg/m²     Gen:  Pt is alert, cooperative, no acute distress  Skin:  Extremities and face reveal no rashes. HEENT: Sclerae anicteric. Extra-occular muscles are intact.   No oral ulcers. No abnormal pigmentation of the lips. The neck is supple. Cardiovascular: Regular rate and rhythm. No murmurs, gallops, or rubs. Respiratory:  Comfortable breathing with no accessory muscle use. Clear breath sounds anteriorly with no wheezes, rales, or rhonchi. GI:  Abdomen nondistended, soft, and nontender. Normal active bowel sounds. No enlargement of the liver or spleen. No masses palpable. Rectal:  Deferred  Musculoskeletal:  No pitting edema of the lower legs. Neurological:  Gross memory appears intact. Patient is alert and oriented. Psychiatric:  Mood appears appropriate with judgement intact. Lymphatic:  No cervical or supraclavicular adenopathy. Laboratory:    CBC, CMP 9/21/20 at [facility]: WBC 3.0 (L), Hgb 6. 9(LL), . 2(H), Platelets 128, Na 583, K 4.1, BUN 12, Creatinine 0.72, Tbili 2.8(H), Alk phos 76, (H), ALT 47, Indirect bili 2.4(H). Albumin 4.0. Hepatitis panel NEG.  TSH 7.095(H), Free T4 0.65(L)    Iron studies at [facility] 9/21/20  Vitamin B12 <109(L)   Folate 13.2   Iron 165   TIBC 201(L)   Transferrin 161(L)   Transferrin % Saturation 82     9/21/20 Hemoccult stool NEGATIVE    Assessment:     Principal Problem:    Chest pain (9/21/2020)    Active Problems:    Paroxysmal atrial fibrillation ([facility] [address] (1/11/2017)      Coronary artery disease involving native coronary artery of native heart with unstable angina pectoris ([address] (1/12/2017)      Anemia (9/21/2020)      Impression/Plan:  67-year-old gentleman with history of CAD status post PCI admitted with chest pain, dyspnea and macrocytic anemia. He is heme-negative. I have a relatively low suspicion for clinically significant source of GI blood loss as a cause of his anemia. Nevertheless, based on chronic NSAID use and symptomatic anemia, will plan for EGD tomorrow to evaluate for upper GI source of blood loss.      Richie Schumacher MD

## 2020-09-21 NOTE — H&P
[facility] Cardiology History & Physical      Date of  Admission: 9/21/2020 12:26 PM     Primary Care Physician:  Dr. Soy Peralta  Primary Cardiologist:  Dr. Brisa Hogue Physician:  Dr. Kvng Chavez    CC:  Chest pain    HPI:  Mariangel Walker is a 54 y.o. male with PMH of CAD (PCI of LAD in 2017), pAF (transient, no reoccurrence, not on [address]) and HTN, who presented to the ED at [facility] with c/o chest pain and shortness of breath. The patient notes that for the last month he has become progressively short of breath, worsening BENNETT, and fatigue. Yesterday, he noted episodes of chest pain that is left sided and tachycardia with activity. The chest pain resolved with rest.  Labs at [facility] showed WBC 3, H&H 6.9/19.5, , plt 130, , K 4.1, BUN 12, creatinine 0.72, troponin 0.02, TSH 7.095, Free T4 0.65, folate 13.1,  iron 165, TIBC 201, trans ferratin  Sat 82, and transferrin 161. Patient was transferred to [facility] for further treatment. On arrival patient is pain free, but he does appear pale. He denies any bloody stool, hematuria, bruising or any other blood loss. He denies heavy alcohol use. Past Medical History:   Diagnosis Date    Coronary artery disease involving native coronary artery of native heart with unstable angina pectoris ([facility] [address] 1/12/2017    Other ill-defined conditions(799.89)     kidney stones      No past surgical history on file.     No Known Allergies   Social History     Socioeconomic History    Marital status:      Spouse name: Not on file    Number of children: Not on file    Years of education: Not on file    Highest education level: Not on file   Occupational History    Not on file   Social Needs    Financial resource strain: Not on file    Food insecurity     Worry: Not on file     Inability: Not on file    Transportation needs     Medical: Not on file     Non-medical: Not on file   Tobacco Use    Smoking status: Never Smoker    Smokeless tobacco: Never Used   Substance and Sexual Activity    Alcohol use: No    Drug use: No    Sexual activity: Not on file   Lifestyle    Physical activity     Days per week: Not on file     Minutes per session: Not on file    Stress: Not on file   Relationships    Social connections     Talks on phone: Not on file     Gets together: Not on file     Attends [employer] service: Not on file     Active member of club or organization: Not on file     Attends meetings of clubs or organizations: Not on file     Relationship status: Not on file    Intimate partner violence     Fear of current or ex partner: Not on file     Emotionally abused: Not on file     Physically abused: Not on file     Forced sexual activity: Not on file   Other Topics Concern    Not on file   Social History Narrative    Not on file     Family History   Problem Relation Age of Onset    No Known Problems Mother     Heart Disease Father     Heart Disease Brother     Heart Disease Paternal Grandfather         Current Facility-Administered Medications   Medication Dose Route Frequency    [START ON 9/22/2020] metoprolol succinate (TOPROL-XL) XL tablet 25 mg  25 mg Oral DAILY    nitroglycerin (NITROSTAT) tablet 0.4 mg  0.4 mg SubLINGual Q5MIN PRN    atorvastatin (LIPITOR) tablet 20 mg  20 mg Oral QHS    sodium chloride (NS) flush 5-40 mL  5-40 mL IntraVENous PRN    acetaminophen (TYLENOL) tablet 650 mg  650 mg Oral Q4H PRN    HYDROcodone-acetaminophen (NORCO) 5-325 mg per tablet 1 Tab  1 Tab Oral Q4H PRN    morphine injection 2 mg  2 mg IntraVENous Q4H PRN    0.9% sodium chloride infusion 250 mL  250 mL IntraVENous PRN       Review of Systems    Review of Systems   Constitution: Negative. HENT: Negative. Eyes: Negative. Cardiovascular: Positive for chest pain and dyspnea on exertion. Respiratory: Positive for shortness of breath. Endocrine: Negative. Hematologic/Lymphatic: Negative. Skin: Negative. Musculoskeletal: Negative.     Gastrointestinal: Negative. Genitourinary: Negative. Neurological: Negative. Psychiatric/Behavioral: Negative. Allergic/Immunologic: Negative. Subjective:     Visit Vitals  BP (!) 148/91   Pulse 84   Temp 98.8 °F (37.1 °C)   Resp 18   Ht 5' 9\" (1.753 m)   Wt 113.4 kg (250 lb)   SpO2 100%   BMI 36.92 kg/m²     Physical Exam  Constitutional:       Appearance: Normal appearance. Eyes:      Pupils: Pupils are equal, round, and reactive to light. Cardiovascular:      Rate and Rhythm: Normal rate and regular rhythm. Pulmonary:      Effort: Pulmonary effort is normal.      Breath sounds: Normal breath sounds. Abdominal:      General: Bowel sounds are normal.   Skin:     General: Skin is warm and dry. Coloration: Skin is pale. Neurological:      General: No focal deficit present. Mental Status: He is alert and oriented to person, place, and time. Psychiatric:         Mood and Affect: Mood normal.         Behavior: Behavior normal.         Thought Content: Thought content normal.         Judgment: Judgment normal.         Cardiographics  Telemetry: normal sinus rhythm  ECG:   Echocardiogram:  pending    Labs: see care everywhere    Patient has been seen and examined by Dr. Linette Parson and he agrees with the following assessment and plan:     Assessment/Plan:       Principal Problem:    Chest pain -- in setting of anemia. Check echo. Hold ASA for now. Continue statin and BB. Active Problems:    Paroxysmal atrial fibrillation -- no reoccurrence since 2017, not on [address]      Coronary artery disease involving native coronary artery of native heart with unstable angina pectoris -- see above       Anemia -- new. Unknown source. Will consult GI and may need hematology consult.         Peter San NP  9/21/2020 1:46 PM

## 2020-09-21 NOTE — PROGRESS NOTES
TRANSFER - IN REPORT:    Verbal report received from Pillo(name) on Sardis Gearing  being received from [facility] ED(unit) for routine progression of care      Report consisted of patients Situation, Background, Assessment and   Recommendations(SBAR). Information from the following report(s) SBAR, Recent Results and Cardiac Rhythm SR was reviewed with the receiving nurse. Opportunity for questions and clarification was provided. Assessment completed upon patients arrival to unit and care assumed.

## 2020-09-22 ENCOUNTER — ANESTHESIA (OUTPATIENT)
Dept: ENDOSCOPY | Age: 45
End: 2020-09-22
Payer: COMMERCIAL

## 2020-09-22 ENCOUNTER — ANESTHESIA EVENT (OUTPATIENT)
Dept: ENDOSCOPY | Age: 45
End: 2020-09-22
Payer: COMMERCIAL

## 2020-09-22 VITALS
SYSTOLIC BLOOD PRESSURE: 95 MMHG | WEIGHT: 182 LBS | BODY MASS INDEX: 26.96 KG/M2 | DIASTOLIC BLOOD PRESSURE: 51 MMHG | HEART RATE: 62 BPM | OXYGEN SATURATION: 94 % | HEIGHT: 69 IN | RESPIRATION RATE: 16 BRPM | TEMPERATURE: 98.5 F

## 2020-09-22 LAB
ABO + RH BLD: NORMAL
ANION GAP SERPL CALC-SCNC: 4 MMOL/L (ref 7–16)
BLD PROD TYP BPU: NORMAL
BLOOD GROUP ANTIBODIES SERPL: NORMAL
BPU ID: NORMAL
BUN SERPL-MCNC: 11 MG/DL (ref 6–23)
CALCIUM SERPL-MCNC: 8.1 MG/DL (ref 8.3–10.4)
CHLORIDE SERPL-SCNC: 106 MMOL/L (ref 98–107)
CHOLEST SERPL-MCNC: 68 MG/DL
CO2 SERPL-SCNC: 29 MMOL/L (ref 21–32)
CREAT SERPL-MCNC: 0.69 MG/DL (ref 0.8–1.5)
CROSSMATCH RESULT,%XM: NORMAL
GLUCOSE SERPL-MCNC: 83 MG/DL (ref 65–100)
HCT VFR BLD AUTO: 26.8 % (ref 41.1–50.3)
HDLC SERPL-MCNC: 21 MG/DL (ref 40–60)
HDLC SERPL: 3.2 {RATIO}
HGB BLD-MCNC: 9.6 G/DL (ref 13.6–17.2)
HGB RETIC QN AUTO: 48 PG (ref 29–35)
IMM RETICS NFR: 9.2 % (ref 2.3–13.4)
LDLC SERPL CALC-MCNC: 22.4 MG/DL
LIPID PROFILE,FLP: ABNORMAL
MCH RBC QN AUTO: 38.6 PG (ref 26.1–32.9)
MCHC RBC AUTO-ENTMCNC: 35.8 G/DL (ref 31.4–35)
MCV RBC AUTO: 107.6 FL (ref 79.6–97.8)
NRBC # BLD: 0.08 K/UL (ref 0–0.2)
PLATELET # BLD AUTO: 124 K/UL (ref 150–450)
PMV BLD AUTO: 12.4 FL (ref 9.4–12.3)
POTASSIUM SERPL-SCNC: 4.4 MMOL/L (ref 3.5–5.1)
RBC # BLD AUTO: 2.49 M/UL (ref 4.23–5.6)
RETICS # AUTO: 0.05 M/UL (ref 0.03–0.1)
RETICS/RBC NFR AUTO: 1.9 % (ref 0.3–2)
SODIUM SERPL-SCNC: 139 MMOL/L (ref 136–145)
SPECIMEN EXP DATE BLD: NORMAL
STATUS OF UNIT,%ST: NORMAL
TRIGL SERPL-MCNC: 123 MG/DL (ref 35–150)
UNIT DIVISION, %UDIV: 0
VLDLC SERPL CALC-MCNC: 24.6 MG/DL (ref 6–23)
WBC # BLD AUTO: 3.5 K/UL (ref 4.3–11.1)

## 2020-09-22 PROCEDURE — 74011000250 HC RX REV CODE- 250: Performed by: NURSE ANESTHETIST, CERTIFIED REGISTERED

## 2020-09-22 PROCEDURE — 74011250636 HC RX REV CODE- 250/636: Performed by: NURSE ANESTHETIST, CERTIFIED REGISTERED

## 2020-09-22 PROCEDURE — 74011250637 HC RX REV CODE- 250/637: Performed by: NURSE PRACTITIONER

## 2020-09-22 PROCEDURE — 99218 HC RM OBSERVATION: CPT

## 2020-09-22 PROCEDURE — 85046 RETICYTE/HGB CONCENTRATE: CPT

## 2020-09-22 PROCEDURE — 36415 COLL VENOUS BLD VENIPUNCTURE: CPT

## 2020-09-22 PROCEDURE — 80061 LIPID PANEL: CPT

## 2020-09-22 PROCEDURE — 85027 COMPLETE CBC AUTOMATED: CPT

## 2020-09-22 PROCEDURE — 2709999900 HC NON-CHARGEABLE SUPPLY: Performed by: INTERNAL MEDICINE

## 2020-09-22 PROCEDURE — 76040000025: Performed by: INTERNAL MEDICINE

## 2020-09-22 PROCEDURE — 74011250636 HC RX REV CODE- 250/636: Performed by: ANESTHESIOLOGY

## 2020-09-22 PROCEDURE — 80048 BASIC METABOLIC PNL TOTAL CA: CPT

## 2020-09-22 PROCEDURE — 76060000031 HC ANESTHESIA FIRST 0.5 HR: Performed by: INTERNAL MEDICINE

## 2020-09-22 RX ORDER — PROPOFOL 10 MG/ML
INJECTION, EMULSION INTRAVENOUS AS NEEDED
Status: DISCONTINUED | OUTPATIENT
Start: 2020-09-22 | End: 2020-09-22 | Stop reason: HOSPADM

## 2020-09-22 RX ORDER — PROPOFOL 10 MG/ML
INJECTION, EMULSION INTRAVENOUS
Status: DISCONTINUED | OUTPATIENT
Start: 2020-09-22 | End: 2020-09-22 | Stop reason: HOSPADM

## 2020-09-22 RX ORDER — SODIUM CHLORIDE 0.9 % (FLUSH) 0.9 %
5-40 SYRINGE (ML) INJECTION AS NEEDED
Status: DISCONTINUED | OUTPATIENT
Start: 2020-09-22 | End: 2020-09-22 | Stop reason: HOSPADM

## 2020-09-22 RX ORDER — SODIUM CHLORIDE 0.9 % (FLUSH) 0.9 %
5-40 SYRINGE (ML) INJECTION EVERY 8 HOURS
Status: DISCONTINUED | OUTPATIENT
Start: 2020-09-22 | End: 2020-09-22 | Stop reason: HOSPADM

## 2020-09-22 RX ORDER — SODIUM CHLORIDE, SODIUM LACTATE, POTASSIUM CHLORIDE, CALCIUM CHLORIDE 600; 310; 30; 20 MG/100ML; MG/100ML; MG/100ML; MG/100ML
100 INJECTION, SOLUTION INTRAVENOUS CONTINUOUS
Status: DISCONTINUED | OUTPATIENT
Start: 2020-09-22 | End: 2020-09-22 | Stop reason: HOSPADM

## 2020-09-22 RX ORDER — LIDOCAINE HYDROCHLORIDE 20 MG/ML
INJECTION, SOLUTION EPIDURAL; INFILTRATION; INTRACAUDAL; PERINEURAL AS NEEDED
Status: DISCONTINUED | OUTPATIENT
Start: 2020-09-22 | End: 2020-09-22 | Stop reason: HOSPADM

## 2020-09-22 RX ADMIN — SODIUM CHLORIDE, SODIUM LACTATE, POTASSIUM CHLORIDE, AND CALCIUM CHLORIDE 100 ML/HR: 600; 310; 30; 20 INJECTION, SOLUTION INTRAVENOUS at 12:50

## 2020-09-22 RX ADMIN — LIDOCAINE HYDROCHLORIDE 60 MG: 20 INJECTION, SOLUTION EPIDURAL; INFILTRATION; INTRACAUDAL; PERINEURAL at 13:13

## 2020-09-22 RX ADMIN — METOPROLOL SUCCINATE 25 MG: 25 TABLET, EXTENDED RELEASE ORAL at 08:28

## 2020-09-22 RX ADMIN — Medication 10 ML: at 06:29

## 2020-09-22 RX ADMIN — PROPOFOL 180 MCG/KG/MIN: 10 INJECTION, EMULSION INTRAVENOUS at 13:13

## 2020-09-22 RX ADMIN — PROPOFOL 100 MG: 10 INJECTION, EMULSION INTRAVENOUS at 13:13

## 2020-09-22 NOTE — DISCHARGE INSTRUCTIONS
Patient Education        Upper GI Endoscopy: What to Expect at [address] had an upper GI endoscopy. Your doctor used a thin, lighted tube that bends to look at the inside of your esophagus, your stomach, and the first part of the small intestine, called the duodenum. After you have an endoscopy, you will stay at the hospital or clinic for 1 to 2 hours. This will allow the medicine to wear off. You will be able to go home after your doctor or nurse checks to make sure that you're not having any problems. You may have to stay overnight if you had treatment during the test. You may have a sore throat for a day or two after the test.  This care sheet gives you a general idea about what to expect after the test.  How can you care for yourself at home? Activity   · Rest as much as you need to after you go home. · You should be able to go back to your usual activities the day after the test.  Diet   · Follow your doctor's directions for eating after the test.  · Drink plenty of fluids (unless your doctor has told you not to). Medications   · If you have a sore throat the day after the test, use an over-the-counter spray to numb your throat. Follow-up care is a key part of your treatment and safety. Be sure to make and go to all appointments, and call your doctor if you are having problems. It's also a good idea to know your test results and keep a list of the medicines you take. When should you call for help? Call 911 anytime you think you may need emergency care. For example, call if:    · You passed out (lost consciousness).     · You have trouble breathing.     · You pass maroon or bloody stools.    Call your doctor now or seek immediate medical care if:    · You have pain that does not get better after your take pain medicine.     · You have new or worse belly pain.     · You have blood in your stools.     · You are sick to your stomach and cannot keep fluids down.     · You have a fever.     · You cannot pass stools or gas. Watch closely for changes in your health, and be sure to contact your doctor if:    · Your throat still hurts after a day or two.     · You do not get better as expected. Where can you learn more? Go to http://lynette-gina.info/  Enter J454 in the search box to learn more about \"Upper GI Endoscopy: What to Expect at Home. \"  Current as of: April 15, 2020               Content Version: 12.6  © 6269-8317 [employer]. Care instructions adapted under license by [employer] (which disclaims liability or warranty for this information). If you have questions about a medical condition or this instruction, always ask your healthcare professional. [address] any warranty or liability for your use of this information.

## 2020-09-22 NOTE — OP NOTES
Gastroenterology Procedure Note           Procedure:  Esophagogastroduodenoscopy    Date of Procedure:  9/22/2020    Patient:  Saul Ho     1975    Indication:  Anemia     Sedation:  MAC    Pre-Procedure Physical Exam:    Mental status:  alert and oriented  Airway:  normal oropharyngeal airway and neck mobility  CV:  regular rate and rhythm  Respiratory:  clear to auscultation    Procedure:  A History and Physical has been performed, and patient medication allergies have been reviewed. Risks of perforation, hemorrhage, adverse drug reaction, and aspiration were discussed. Informed consent was obtained for the procedure, including sedation. The patient was placed in the left lateral decubitus position. The heart rate, oxygen saturations, blood pressure, and response to care were monitored throughout the procedure. The gastroscope was passed through the mouth and advanced under direct vision to the second portion of the duodenum. A careful inspection was made as the gastroscope was withdrawn, including a retroflexed view of the proximal stomach. The patient tolerated the procedure well. Findings:     OROPHARYNX: Cords and pyriform recesses appear normal.   ESOPHAGUS: The esophagus is normal. The proximal, mid, and distal portions are normal. The Z-Line is intact. STOMACH: On retroflexion, the flap-valve is classified as Hill Grade II, with a tissue fold at the lesser curvature but with periodic opening and closing around the endoscope. The fundus on antegrade and retroflexed views is normal. The body, antrum, and pylorus are normal.   DUODENUM: The bulb and second portions are normal.    Specimen:  No    Estimated Blood Loss:  None    Implant:  None           Impression:    Normal EGD. No source of GI blood loss anemia was seen. Very low suspicion for any recent GI bleeding. Plan:  1. Resume diet and current medications.    2. Consult hematology for further workup of macrocytic anemia. 3. We will sign off, but do not hesitate to contact us for any additional assistance.      Signed:  Ernesto Padgett MD  9/22/2020  8:27 AM

## 2020-09-22 NOTE — ANESTHESIA POSTPROCEDURE EVALUATION
Procedure(s):  ESOPHAGOGASTRODUODENOSCOPY (EGD)/ BMI 36 ROOM 308. No value filed. Anesthesia Post Evaluation      Multimodal analgesia: multimodal analgesia used between 6 hours prior to anesthesia start to PACU discharge  Patient location during evaluation: PACU  Patient participation: complete - patient participated  Level of consciousness: awake and alert  Pain management: adequate  Airway patency: patent  Anesthetic complications: no  Cardiovascular status: acceptable  Respiratory status: acceptable  Hydration status: acceptable  Post anesthesia nausea and vomiting:  none  Final Post Anesthesia Temperature Assessment:  Normothermia (36.0-37.5 degrees C)      INITIAL Post-op Vital signs:   Vitals Value Taken Time   BP 86/51 9/22/2020  1:32 PM   Temp     Pulse 58 9/22/2020  1:36 PM   Resp 14 9/22/2020  1:32 PM   SpO2 98 % 9/22/2020  1:36 PM   Vitals shown include unvalidated device data.

## 2020-09-22 NOTE — H&P
History and Physical for Procedure             Date: 9/22/2020     History of Present Illness:  Patient presents to undergo EGD. Past Medical History:   Diagnosis Date    Coronary artery disease involving native coronary artery of native heart with unstable angina pectoris ([facility] [address] 1/12/2017    Other ill-defined conditions(799.89)     kidney stones     No past surgical history on file. In and  Family History   Problem Relation Age of Onset    No Known Problems Mother     Heart Disease Father     Heart Disease Brother     Heart Disease Paternal Grandfather      Social History     Tobacco Use    Smoking status: Never Smoker    Smokeless tobacco: Never Used   Substance Use Topics    Alcohol use: No        No Known Allergies  Current Facility-Administered Medications   Medication Dose Route Frequency    metoprolol succinate (TOPROL-XL) XL tablet 25 mg  25 mg Oral DAILY    nitroglycerin (NITROSTAT) tablet 0.4 mg  0.4 mg SubLINGual Q5MIN PRN    atorvastatin (LIPITOR) tablet 20 mg  20 mg Oral QHS    sodium chloride (NS) flush 5-40 mL  5-40 mL IntraVENous PRN    acetaminophen (TYLENOL) tablet 650 mg  650 mg Oral Q4H PRN    HYDROcodone-acetaminophen (NORCO) 5-325 mg per tablet 1 Tab  1 Tab Oral Q4H PRN    morphine injection 2 mg  2 mg IntraVENous Q4H PRN    0.9% sodium chloride infusion 250 mL  250 mL IntraVENous PRN    influenza vaccine 2020-21 (6 mos+)(PF) (FLUARIX/FLULAVAL/FLUZONE QUAD) injection 0.5 mL  0.5 mL IntraMUSCular PRIOR TO DISCHARGE        Review of Systems:  A detailed 10 organ review of systems is obtained with pertinent positives as listed in the History of Present Illness. All others are negative. Objective:     Physical Exam:  Visit Vitals  /80 (BP 1 Location: Right arm, BP Patient Position: At rest)   Pulse 71   Temp 98.2 °F (36.8 °C)   Resp 18   Ht 5' 9\" (1.753 m)   Wt 82.6 kg (182 lb)   SpO2 96%   BMI 26.88 kg/m²      General:  Alert and oriented.   Heart: Regular rate and rhythm  Lungs:  Clear to auscultation bilaterally  Abdomen: Soft, nontender, nondistended    Impression/Plan:     Proceed with EGD as planned. I have discussed with the patient the technique, benefits, alternatives, and risks of these procedures, including medication reaction, immediate or delayed bleeding, or perforation of the gastrointestinal tract.      Signed By: J Luis Shabazz MD     September 22, 2020

## 2020-09-22 NOTE — PROGRESS NOTES
[facility] CARDIOLOGY PROGRESS NOTE           9/22/2020 4:06 PM    Admit Date: 9/21/2020      Subjective:   No angina    ROS:  Cardiovascular:  As noted above    Objective:      Vitals:    09/22/20 1324 09/22/20 1332 09/22/20 1342 09/22/20 1353   BP: (!) 94/54 (!) 86/51 (!) 96/52 (!) 95/51   Pulse: 62 (!) 59 60 62   Resp: 16 14 16 16   Temp:       SpO2: 97% 98% 96% 94%   Weight:       Height:           Physical Exam:  General-No Acute Distress  Neck- supple, no JVD  CV- regular rate and rhythm no MRG  Lung- clear bilaterally  Abd- soft, nontender, nondistended  Ext- no edema bilaterally.   Skin- warm and dry    Data Review:   Recent Labs     09/22/20  0432 09/21/20  1555     --    K 4.4  --    BUN 11  --    CREA 0.69*  --    GLU 83  --    WBC 3.5* 3.4*   HGB 9.6* 7.3*   HCT 26.8* 21.1*   * 129*   CHOL 68  --    LDLC 22.4  --    HDL 21*  --      EGD: negative    Assessment/Plan:     Active Problems:    Macrocytic anemia (9/21/2020)          Coronary artery disease involving native coronary artery of native heart with unstable angina pectoris ([facility] [address] (1/12/2017)          History of atrial fibrillation (9/21/2020)      ///    Check folate,b12  Heme consult OP  Home          Lincoln Don MD  9/22/2020 4:06 PM

## 2020-09-22 NOTE — ROUTINE PROCESS
TRANSFER - OUT REPORT: 
 
Verbal report given to Ladi BERGER(name) on Lali Blake  being transferred to [facility](unit) for routine post - op Report consisted of patients Situation, Background, Assessment and  
Recommendations(SBAR). Information from the following report(s) SBAR and Procedure Summary was reviewed with the receiving nurse. Lines:  
Peripheral IV 09/21/20 Left Antecubital (Active) Site Assessment Clean, dry, & intact 09/22/20 1249 Phlebitis Assessment 0 09/22/20 1249 Infiltration Assessment 0 09/22/20 1249 Dressing Status Clean, dry, & intact 09/22/20 1249 Dressing Type Transparent;Tape 09/22/20 1249 Hub Color/Line Status Pink;Patent; Infusing;Flushed 09/22/20 1249 Alcohol Cap Used No 09/22/20 0815 Opportunity for questions and clarification was provided. Patient transported with: 
 [employer]

## 2020-09-22 NOTE — PROGRESS NOTES
Bedside and Verbal shift change report to be given to self (oncoming nurse) by Cristy Everett (offgoing nurse). Report included the following information SBAR, Kardex, MAR and Recent Results.

## 2020-09-22 NOTE — PROGRESS NOTES
TRANSFER - IN REPORT:    Verbal report received from Ilan Wright RN(name) on [facility] Meghann  being received from 3rd floor(unit) for ordered procedure      Report consisted of patients Situation, Background, Assessment and   Recommendations(SBAR). Information from the following report(s) SBAR, Kardex, Intake/Output, MAR and Recent Results was reviewed with the receiving nurse. Opportunity for questions and clarification was provided. Assessment completed upon patients arrival to unit and care assumed.

## 2020-09-22 NOTE — DISCHARGE SUMMARY
[facility] Discharge Summary     Patient ID:  Ebony Rubinstein  [id number]  28 y.o.  1975    Admit date: 2020    Discharge date:  2020    Admitting Physician: Chito Aguirre MD     Discharge Physician: Jeanette Gonzalez NP/Dr. Virgilio Lo    Admission Diagnoses: Chest pain [R07.9]    Discharge Diagnoses:    Diagnosis    Chest pain    Macrocytic anemia    History of atrial fibrillation    Coronary artery disease involving native coronary artery of native heart     Paroxysmal atrial fibrillation ([address]    Precordial pain       Cardiology Procedures this admission:  EGD  Consults: GI    Hospital Course: Patient presented to the ER @ [facility] with c/o with c/o chest pain and shortness of breath. The patient noted that for the last month he has become progressively short of breath, worsening BENNETT, and fatigue. He noted episodes of chest pain that is left sided and tachycardia with activity. The chest pain resolved with rest.  Labs at [facility] showed WBC 3, H&H 6.9/19.5, , plt 130, , K 4.1, BUN 12, creatinine 0.72, troponin 0.02, TSH 7.095, Free T4 0.65, folate 13.1,  iron 165, TIBC 201, trans ferratin  Sat 82, and transferrin 161. Patient was transferred to [facility] for further treatment. On arrival patient was pain free, but he does appear pale. He denied any bloody stool, hematuria, bruising or any other blood loss. He denied heavy alcohol use. GI was consulted for anemia and patient underwent EGD was WNL, no source of bleeding was identified. Patient ws transfused with PRBCs x 2. Hgb at discharge was 9.6. His symptoms were resolved. Patient will follow up with hematology as OP. Echo results        -  Left ventricle: Systolic function was normal. Ejection fraction was  estimated in the range of 55 % to 60 %. There were no regional wall motion  abnormalities.  Left ventricular diastolic function parameters were normal.   E/e' av.66.     -  Right ventricle: There was no pulmonary artery hypertension.     -  Tricuspid valve: There was trivial regurgitation.       The morning of discharge, patient was up feeling well without any complaints of chest pain or shortness of breath. The patient will follow up with [facility] -- Dr. Toan Melgar as needed. Patient will have CBC on Friday. DISPOSITION: The patient is being discharged home in stable condition on a low saturated fat, low cholesterol and low salt diet. The patient is instructed to advance activities as tolerated to the limit of fatigue or shortness of breath. The patient is instructed to avoid all heavy lifting, straining, stooping or squatting for 3-5 days. The patient is instructed to watch the cath site for bleeding/oozing; if seen, the patient is instructed to apply firm pressure with a clean cloth and call [facility] at [phone]. The patient is instructed to watch for signs of infection which include: increasing area of redness, fever/hot to touch or purulent drainage at the catheterization site. The patient is instructed not to soak in a bathtub for 7-10 days, but is cleared to shower. The patient is instructed to call the office or return to the ER for immediate evaluation for any shortness of breath or chest pain not relieved by NTG.         Discharge Exam:   Visit Vitals  BP (!) 95/51   Pulse 62   Temp 98.5 °F (36.9 °C)   Resp 16   Ht 5' 9\" (1.753 m)   Wt 82.6 kg (182 lb)   SpO2 94%   BMI 26.88 kg/m²         Recent Results (from the past 24 hour(s))   METABOLIC PANEL, BASIC    Collection Time: 09/22/20  4:32 AM   Result Value Ref Range    Sodium 139 136 - 145 mmol/L    Potassium 4.4 3.5 - 5.1 mmol/L    Chloride 106 98 - 107 mmol/L    CO2 29 21 - 32 mmol/L    Anion gap 4 (L) 7 - 16 mmol/L    Glucose 83 65 - 100 mg/dL    BUN 11 6 - 23 MG/DL    Creatinine 0.69 (L) 0.8 - 1.5 MG/DL    GFR est AA >60 >60 ml/min/1.73m2    GFR est non-AA >60 >60 ml/min/1.73m2    Calcium 8.1 (L) 8.3 - 10.4 MG/DL LIPID PANEL    Collection Time: 09/22/20  4:32 AM   Result Value Ref Range    LIPID PROFILE          Cholesterol, total 68 <200 MG/DL    Triglyceride 123 35 - 150 MG/DL    HDL Cholesterol 21 (L) 40 - 60 MG/DL    LDL, calculated 22.4 <100 MG/DL    VLDL, calculated 24.6 (H) 6.0 - 23.0 MG/DL    CHOL/HDL Ratio 3.2     CBC W/O DIFF    Collection Time: 09/22/20  4:32 AM   Result Value Ref Range    WBC 3.5 (L) 4.3 - 11.1 K/uL    RBC 2.49 (L) 4.23 - 5.6 M/uL    HGB 9.6 (L) 13.6 - 17.2 g/dL    HCT 26.8 (L) 41.1 - 50.3 %    .6 (H) 79.6 - 97.8 FL    MCH 38.6 (H) 26.1 - 32.9 PG    MCHC 35.8 (H) 31.4 - 35.0 g/dL    PLATELET 997 (L) 658 - 450 K/uL    MPV 12.4 (H) 9.4 - 12.3 FL    ABSOLUTE NRBC 0.08 0.0 - 0.2 K/uL   RETICULOCYTE COUNT    Collection Time: 09/22/20  4:32 AM   Result Value Ref Range    Reticulocyte count 1.9 0.3 - 2.0 %    Absolute Retic Cnt. 0.0475 0.026 - 0.095 M/ul    Immature Retic Fraction 9.2 2.3 - 13.4 %    Retic Hgb Conc. 48 (H) 29 - 35 pg         Patient Instructions:        Current Discharge Medication List      CONTINUE these medications which have NOT CHANGED    Details   ezetimibe-simvastatin (VYTORIN) 10-20 mg per tablet Take 1 Tab by mouth nightly. Qty: 90 Tab, Refills: 3      metoprolol succinate (TOPROL-XL) 25 mg XL tablet Take 1 Tab by mouth daily. Qty: 90 Tab, Refills: 3      aspirin 81 mg chewable tablet Take 1 Tab by mouth daily. Qty: 30 Tab, Refills: 0      nitroglycerin (NITROSTAT) 0.4 mg SL tablet 1 Tab by SubLINGual route every five (5) minutes as needed for Chest Pain.   Qty: 1 Bottle, Refills: 5             Signed:  Mio Wells NP  9/22/2020  4:28 PM

## 2020-09-22 NOTE — PROGRESS NOTES
CM chart reviewed. It appears at this time that the pt does not require any d/c needs from CM. CM will continue to follow.     Care Management Interventions  PCP Verified by CM: Yes(Dr. Kim Hussein MD)  Mode of Transport at Discharge: Self  Transition of Care Consult (CM Consult): Discharge Planning  Discharge Durable Medical Equipment: No  Physical Therapy Consult: No  Occupational Therapy Consult: No  Speech Therapy Consult: No  Current Support Network: Family Lives Nearby  Confirm Follow Up Transport: Self  The Plan for Transition of Care is Related to the Following Treatment Goals : Patient return to his baseline  The Patient and/or Patient Representative was Provided with a Choice of Provider and Agrees with the Discharge Plan?: Yes  Name of the Patient Representative Who was Provided with a Choice of Provider and Agrees with the Discharge Plan: Patient  Freedom of Choice List was Provided with Basic Dialogue that Supports the Patient's Individualized Plan of Care/Goals, Treatment Preferences and Shares the Quality Data Associated with the Providers?: Yes  [place] Resource Information Provided?: No  Discharge Location  Discharge Placement: Home

## 2020-09-22 NOTE — ROUTINE PROCESS
Bedside and Verbal report given to self by Yamil Momin. Report included SBAR, Kardex, ED Summary, Procedure Summary, Intake and Output and Cardiac Rhythm NSR. Blood signed off on with off going RN.

## 2020-09-22 NOTE — ANESTHESIA PREPROCEDURE EVALUATION
Relevant Problems   No relevant active problems       Anesthetic History               Review of Systems / Medical History  Patient summary reviewed and pertinent labs reviewed    Pulmonary  Within defined limits                 Neuro/Psych   Within defined limits           Cardiovascular            Dysrhythmias : atrial fibrillation  CAD and cardiac stents (Single stent in 2017)      Comments: Echo from 9/21:  Normal EF, trivial TR   GI/Hepatic/Renal  Within defined limits              Endo/Other        Anemia     Other Findings   Comments: Macrocytic anemia, required two units PRBC overnight         Physical Exam    Airway  Mallampati: II  TM Distance: 4 - 6 cm  Neck ROM: normal range of motion   Mouth opening: Normal     Cardiovascular    Rhythm: regular  Rate: normal         Dental  No notable dental hx       Pulmonary  Breath sounds clear to auscultation               Abdominal         Other Findings            Anesthetic Plan    ASA: 3  Anesthesia type: total IV anesthesia            Anesthetic plan and risks discussed with: Patient

## 2020-09-22 NOTE — ROUTINE PROCESS
Bedside and Verbal report given to Ladi BERGER by self. Report included SBAR, Kardex, ED summary, procedure summary, recent results and cardiac rhythm NSR.

## 2020-09-22 NOTE — PROGRESS NOTES
Bedside and Verbal shift change report to be given to [employer] (oncoming nurse) by self (offgoing nurse). Report included the following information SBAR, Kardex, MAR and Recent Results. Blood transfusing and rate verified at bedside by two RNs.

## 2020-09-23 LAB — PATH REV BLD -IMP: NORMAL

## 2020-10-02 ENCOUNTER — HOSPITAL ENCOUNTER (OUTPATIENT)
Dept: LAB | Age: 45
Discharge: HOME | End: 2020-10-02
Payer: COMMERCIAL

## 2020-10-02 DIAGNOSIS — D53.9 MACROCYTIC ANEMIA: ICD-10-CM

## 2020-10-02 LAB
ALBUMIN SERPL-MCNC: 4.1 G/DL (ref 3.5–5)
ALBUMIN/GLOB SERPL: 1.1 {RATIO} (ref 1.2–3.5)
ALP SERPL-CCNC: 86 U/L (ref 50–136)
ALT SERPL-CCNC: 29 U/L (ref 12–65)
ANION GAP SERPL CALC-SCNC: 6 MMOL/L (ref 7–16)
APTT PPP: 34.8 SEC (ref 24.3–35.4)
AST SERPL-CCNC: 25 U/L (ref 15–37)
BASOPHILS # BLD: 0 K/UL (ref 0–0.2)
BASOPHILS NFR BLD: 0 % (ref 0–2)
BILIRUB DIRECT SERPL-MCNC: 0.3 MG/DL
BILIRUB INDIRECT SERPL-MCNC: 3.4 MG/DL (ref 0–1.1)
BILIRUB SERPL-MCNC: 3.7 MG/DL (ref 0.2–1.1)
BUN SERPL-MCNC: 8 MG/DL (ref 6–23)
CALCIUM SERPL-MCNC: 8.5 MG/DL (ref 8.3–10.4)
CHLORIDE SERPL-SCNC: 108 MMOL/L (ref 98–107)
CO2 SERPL-SCNC: 27 MMOL/L (ref 21–32)
CREAT SERPL-MCNC: 0.7 MG/DL (ref 0.8–1.5)
D DIMER PPP FEU-MCNC: 0.49 UG/ML(FEU)
DAT POLY-SP REAG RBC QL: NORMAL
DIFFERENTIAL METHOD BLD: ABNORMAL
EOSINOPHIL # BLD: 0.2 K/UL (ref 0–0.8)
EOSINOPHIL NFR BLD: 7 % (ref 0.5–7.8)
FERRITIN SERPL-MCNC: 375 NG/ML (ref 8–388)
FIBRINOGEN PPP-MCNC: 284 MG/DL (ref 190–501)
FOLATE SERPL-MCNC: 42.8 NG/ML (ref 3.1–17.5)
GLOBULIN SER CALC-MCNC: 3.7 G/DL (ref 2.3–3.5)
GLUCOSE SERPL-MCNC: 89 MG/DL (ref 65–100)
HAPTOGLOB SERPL-MCNC: 22 MG/DL (ref 30–200)
HCT VFR BLD AUTO: 28.3 % (ref 41.1–50.3)
HGB BLD-MCNC: 9.9 G/DL (ref 13.6–17.2)
HGB RETIC QN AUTO: 47 PG (ref 29–35)
IMM GRANULOCYTES # BLD AUTO: 0 K/UL (ref 0–0.5)
IMM GRANULOCYTES NFR BLD AUTO: 1 % (ref 0–5)
IMM RETICS NFR: 19.4 % (ref 2.3–13.4)
INR PPP: 1.1
IRON SATN MFR SERPL: 72 %
IRON SERPL-MCNC: 159 UG/DL (ref 35–150)
LDH SERPL L TO P-CCNC: 1397 U/L (ref 100–190)
LYMPHOCYTES # BLD: 1.1 K/UL (ref 0.5–4.6)
LYMPHOCYTES NFR BLD: 34 % (ref 13–44)
MCH RBC QN AUTO: 39.1 PG (ref 26.1–32.9)
MCHC RBC AUTO-ENTMCNC: 35 G/DL (ref 31.4–35)
MCV RBC AUTO: 111.9 FL (ref 79.6–97.8)
MONOCYTES # BLD: 0.4 K/UL (ref 0.1–1.3)
MONOCYTES NFR BLD: 14 % (ref 4–12)
NEUTS SEG # BLD: 1.5 K/UL (ref 1.7–8.2)
NEUTS SEG NFR BLD: 44 % (ref 43–78)
NRBC # BLD: 0.04 K/UL (ref 0–0.2)
PLATELET # BLD AUTO: 288 K/UL (ref 150–450)
PLATELET COMMENTS,PCOM: ADEQUATE
PMV BLD AUTO: 10.1 FL (ref 9.4–12.3)
POTASSIUM SERPL-SCNC: 4.3 MMOL/L (ref 3.5–5.1)
PROT SERPL-MCNC: 7.8 G/DL (ref 6.3–8.2)
PROTHROMBIN TIME: 14.5 SEC (ref 12–14.7)
RBC # BLD AUTO: 2.53 M/UL (ref 4.23–5.67)
RBC MORPH BLD: ABNORMAL
RETICS # AUTO: 0.16 M/UL (ref 0.03–0.1)
RETICS/RBC NFR AUTO: 6.1 % (ref 0.3–2)
SODIUM SERPL-SCNC: 141 MMOL/L (ref 136–145)
TIBC SERPL-MCNC: 220 UG/DL (ref 250–450)
VIT B12 SERPL-MCNC: <60 PG/ML (ref 193–986)
WBC # BLD AUTO: 3.2 K/UL (ref 4.3–11.1)
WBC MORPH BLD: ABNORMAL

## 2020-10-02 PROCEDURE — 85730 THROMBOPLASTIN TIME PARTIAL: CPT

## 2020-10-02 PROCEDURE — 86880 COOMBS TEST DIRECT: CPT

## 2020-10-02 PROCEDURE — 82248 BILIRUBIN DIRECT: CPT

## 2020-10-02 PROCEDURE — 82746 ASSAY OF FOLIC ACID SERUM: CPT

## 2020-10-02 PROCEDURE — 86334 IMMUNOFIX E-PHORESIS SERUM: CPT

## 2020-10-02 PROCEDURE — 36415 COLL VENOUS BLD VENIPUNCTURE: CPT

## 2020-10-02 PROCEDURE — 88184 FLOWCYTOMETRY/ TC 1 MARKER: CPT

## 2020-10-02 PROCEDURE — 84165 PROTEIN E-PHORESIS SERUM: CPT

## 2020-10-02 PROCEDURE — 85046 RETICYTE/HGB CONCENTRATE: CPT

## 2020-10-02 PROCEDURE — 85610 PROTHROMBIN TIME: CPT

## 2020-10-02 PROCEDURE — 86747 PARVOVIRUS ANTIBODY: CPT

## 2020-10-02 PROCEDURE — 82728 ASSAY OF FERRITIN: CPT

## 2020-10-02 PROCEDURE — 83540 ASSAY OF IRON: CPT

## 2020-10-02 PROCEDURE — 87798 DETECT AGENT NOS DNA AMP: CPT

## 2020-10-02 PROCEDURE — 80053 COMPREHEN METABOLIC PANEL: CPT

## 2020-10-02 PROCEDURE — 83615 LACTATE (LD) (LDH) ENZYME: CPT

## 2020-10-02 PROCEDURE — 85379 FIBRIN DEGRADATION QUANT: CPT

## 2020-10-02 PROCEDURE — 85384 FIBRINOGEN ACTIVITY: CPT

## 2020-10-02 PROCEDURE — 82607 VITAMIN B-12: CPT

## 2020-10-02 PROCEDURE — 85025 COMPLETE CBC W/AUTO DIFF WBC: CPT

## 2020-10-02 PROCEDURE — 83010 ASSAY OF HAPTOGLOBIN QUANT: CPT

## 2020-10-05 LAB
B19V IGG SER IA-ACNC: 9 INDEX (ref 0–0.8)
B19V IGM SER IA-ACNC: 0.2 INDEX (ref 0–0.8)
CD59 CELLS BLD QL: NORMAL
PATH REV BLD -IMP: NORMAL

## 2020-10-06 LAB
ALBUMIN SERPL ELPH-MCNC: 4.25 G/DL (ref 3.2–5.6)
ALBUMIN/GLOB SERPL: 1.3 {RATIO}
ALPHA1 GLOB SERPL ELPH-MCNC: 0.2 G/DL (ref 0.1–0.4)
ALPHA2 GLOB SERPL ELPH-MCNC: 0.41 G/DL (ref 0.4–1.2)
B-GLOBULIN SERPL QL ELPH: 0.83 G/DL (ref 0.6–1.3)
B19V DNA SPEC QL NAA+PROBE: NEGATIVE
GAMMA GLOB MFR SERPL ELPH: 1.82 G/DL (ref 0.5–1.6)
IGA SERPL-MCNC: 408 MG/DL (ref 85–499)
IGG SERPL-MCNC: 1536 MG/DL (ref 610–1616)
IGM SERPL-MCNC: 187 MG/DL (ref 35–242)
M PROTEIN SERPL ELPH-MCNC: ABNORMAL G/DL
PROT PATTERN SERPL ELPH-IMP: ABNORMAL
PROT PATTERN SPEC IFE-IMP: ABNORMAL
PROT SERPL-MCNC: 7.5 G/DL (ref 6.3–8.2)

## 2020-10-08 ENCOUNTER — HOSPITAL ENCOUNTER (OUTPATIENT)
Dept: LAB | Age: 45
Discharge: HOME | End: 2020-10-08
Payer: COMMERCIAL

## 2020-10-08 DIAGNOSIS — E53.8 B12 DEFICIENCY: ICD-10-CM

## 2020-10-08 DIAGNOSIS — D53.9 MACROCYTIC ANEMIA: ICD-10-CM

## 2020-10-08 LAB
ALBUMIN SERPL-MCNC: 4.1 G/DL (ref 3.5–5)
ALBUMIN/GLOB SERPL: 1.1 {RATIO} (ref 1.2–3.5)
ALP SERPL-CCNC: 82 U/L (ref 50–136)
ALT SERPL-CCNC: 23 U/L (ref 12–65)
ANION GAP SERPL CALC-SCNC: 5 MMOL/L (ref 7–16)
AST SERPL-CCNC: 24 U/L (ref 15–37)
BASOPHILS # BLD: 0 K/UL (ref 0–0.2)
BASOPHILS NFR BLD: 0 % (ref 0–2)
BILIRUB DIRECT SERPL-MCNC: 0.3 MG/DL
BILIRUB INDIRECT SERPL-MCNC: 2.5 MG/DL (ref 0–1.1)
BILIRUB SERPL-MCNC: 2.8 MG/DL (ref 0.2–1.1)
BUN SERPL-MCNC: 8 MG/DL (ref 6–23)
CALCIUM SERPL-MCNC: 8.5 MG/DL (ref 8.3–10.4)
CHLORIDE SERPL-SCNC: 107 MMOL/L (ref 98–107)
CO2 SERPL-SCNC: 27 MMOL/L (ref 21–32)
CREAT SERPL-MCNC: 0.8 MG/DL (ref 0.8–1.5)
DIFFERENTIAL METHOD BLD: ABNORMAL
EOSINOPHIL # BLD: 0.4 K/UL (ref 0–0.8)
EOSINOPHIL NFR BLD: 8 % (ref 0.5–7.8)
GLOBULIN SER CALC-MCNC: 3.6 G/DL (ref 2.3–3.5)
GLUCOSE SERPL-MCNC: 102 MG/DL (ref 65–100)
HAPTOGLOB SERPL-MCNC: 43 MG/DL (ref 30–200)
HCT VFR BLD AUTO: 28.4 % (ref 41.1–50.3)
HGB BLD-MCNC: 9.8 G/DL (ref 13.6–17.2)
HGB RETIC QN AUTO: 47 PG (ref 29–35)
IMM GRANULOCYTES # BLD AUTO: 0 K/UL (ref 0–0.5)
IMM GRANULOCYTES NFR BLD AUTO: 0 % (ref 0–5)
IMM RETICS NFR: 10.2 % (ref 2.3–13.4)
LDH SERPL L TO P-CCNC: 691 U/L (ref 100–190)
LYMPHOCYTES # BLD: 1.3 K/UL (ref 0.5–4.6)
LYMPHOCYTES NFR BLD: 28 % (ref 13–44)
MCH RBC QN AUTO: 39.8 PG (ref 26.1–32.9)
MCHC RBC AUTO-ENTMCNC: 34.5 G/DL (ref 31.4–35)
MCV RBC AUTO: 115.4 FL (ref 79.6–97.8)
MONOCYTES # BLD: 0.5 K/UL (ref 0.1–1.3)
MONOCYTES NFR BLD: 10 % (ref 4–12)
NEUTS SEG # BLD: 2.3 K/UL (ref 1.7–8.2)
NEUTS SEG NFR BLD: 54 % (ref 43–78)
NRBC # BLD: 0.03 K/UL (ref 0–0.2)
PLATELET # BLD AUTO: 414 K/UL (ref 150–450)
PLATELET COMMENTS,PCOM: ADEQUATE
PMV BLD AUTO: 9.3 FL (ref 9.4–12.3)
POTASSIUM SERPL-SCNC: 3.9 MMOL/L (ref 3.5–5.1)
PROT SERPL-MCNC: 7.7 G/DL (ref 6.3–8.2)
RBC # BLD AUTO: 2.46 M/UL (ref 4.23–5.67)
RBC MORPH BLD: ABNORMAL
RETICS # AUTO: 0.1 M/UL (ref 0.03–0.1)
RETICS/RBC NFR AUTO: 3.9 % (ref 0.3–2)
SODIUM SERPL-SCNC: 139 MMOL/L (ref 136–145)
WBC # BLD AUTO: 4.5 K/UL (ref 4.3–11.1)
WBC MORPH BLD: ABNORMAL

## 2020-10-08 PROCEDURE — 80053 COMPREHEN METABOLIC PANEL: CPT

## 2020-10-08 PROCEDURE — 82248 BILIRUBIN DIRECT: CPT

## 2020-10-08 PROCEDURE — 36415 COLL VENOUS BLD VENIPUNCTURE: CPT

## 2020-10-08 PROCEDURE — 85025 COMPLETE CBC W/AUTO DIFF WBC: CPT

## 2020-10-08 PROCEDURE — 83516 IMMUNOASSAY NONANTIBODY: CPT

## 2020-10-08 PROCEDURE — 85046 RETICYTE/HGB CONCENTRATE: CPT

## 2020-10-08 PROCEDURE — 86340 INTRINSIC FACTOR ANTIBODY: CPT

## 2020-10-08 PROCEDURE — 83615 LACTATE (LD) (LDH) ENZYME: CPT

## 2020-10-08 PROCEDURE — 83010 ASSAY OF HAPTOGLOBIN QUANT: CPT

## 2020-10-09 LAB
IF BLOCK AB SER-ACNC: 1.1 AU/ML (ref 0–1.1)
PCA AB SER-ACNC: 28.5 UNITS (ref 0–20)

## 2020-10-12 ENCOUNTER — HOSPITAL ENCOUNTER (OUTPATIENT)
Dept: INFUSION THERAPY | Age: 45
Discharge: HOME | End: 2020-10-12
Payer: COMMERCIAL

## 2020-10-12 VITALS
RESPIRATION RATE: 18 BRPM | DIASTOLIC BLOOD PRESSURE: 75 MMHG | HEART RATE: 73 BPM | TEMPERATURE: 99.1 F | SYSTOLIC BLOOD PRESSURE: 138 MMHG | OXYGEN SATURATION: 100 %

## 2020-10-12 DIAGNOSIS — E53.8 B12 DEFICIENCY: Primary | ICD-10-CM

## 2020-10-12 PROCEDURE — 74011250636 HC RX REV CODE- 250/636: Performed by: INTERNAL MEDICINE

## 2020-10-12 PROCEDURE — 96372 THER/PROPH/DIAG INJ SC/IM: CPT

## 2020-10-12 PROCEDURE — 74011250637 HC RX REV CODE- 250/637: Performed by: INTERNAL MEDICINE

## 2020-10-12 RX ORDER — POTASSIUM CHLORIDE 750 MG/1
40 TABLET, EXTENDED RELEASE ORAL
Status: COMPLETED | OUTPATIENT
Start: 2020-10-12 | End: 2020-10-12

## 2020-10-12 RX ORDER — CYANOCOBALAMIN 1000 UG/ML
1000 INJECTION, SOLUTION INTRAMUSCULAR; SUBCUTANEOUS ONCE
Status: COMPLETED | OUTPATIENT
Start: 2020-10-12 | End: 2020-10-12

## 2020-10-12 RX ADMIN — CYANOCOBALAMIN 1000 MCG: 1000 INJECTION, SOLUTION INTRAMUSCULAR at 17:52

## 2020-10-12 RX ADMIN — POTASSIUM CHLORIDE 40 MEQ: 750 TABLET, EXTENDED RELEASE ORAL at 17:53

## 2020-10-12 NOTE — PROGRESS NOTES
Arrived to the [facility]. B12 injection and oral potassium given. Patient tolerated well. Any issues or concerns during appointment: NO.  Patient aware of next infusion appointment on 10/13/20  at [address] [place] [address] . Discharged ambulatory.

## 2020-10-13 ENCOUNTER — HOSPITAL ENCOUNTER (OUTPATIENT)
Dept: INFUSION THERAPY | Age: 45
Discharge: HOME | End: 2020-10-13
Payer: COMMERCIAL

## 2020-10-13 VITALS
OXYGEN SATURATION: 100 % | BODY MASS INDEX: 28.21 KG/M2 | WEIGHT: 191 LBS | HEART RATE: 58 BPM | DIASTOLIC BLOOD PRESSURE: 84 MMHG | TEMPERATURE: 97.7 F | SYSTOLIC BLOOD PRESSURE: 125 MMHG | RESPIRATION RATE: 16 BRPM

## 2020-10-13 DIAGNOSIS — E53.8 B12 DEFICIENCY: Primary | ICD-10-CM

## 2020-10-13 PROCEDURE — 96372 THER/PROPH/DIAG INJ SC/IM: CPT

## 2020-10-13 PROCEDURE — 74011250637 HC RX REV CODE- 250/637: Performed by: INTERNAL MEDICINE

## 2020-10-13 PROCEDURE — 74011250636 HC RX REV CODE- 250/636: Performed by: INTERNAL MEDICINE

## 2020-10-13 RX ORDER — CYANOCOBALAMIN 1000 UG/ML
1000 INJECTION, SOLUTION INTRAMUSCULAR; SUBCUTANEOUS ONCE
Status: COMPLETED | OUTPATIENT
Start: 2020-10-13 | End: 2020-10-13

## 2020-10-13 RX ORDER — POTASSIUM CHLORIDE 750 MG/1
40 TABLET, EXTENDED RELEASE ORAL
Status: COMPLETED | OUTPATIENT
Start: 2020-10-13 | End: 2020-10-13

## 2020-10-13 RX ADMIN — POTASSIUM CHLORIDE 40 MEQ: 750 TABLET, EXTENDED RELEASE ORAL at 07:15

## 2020-10-13 RX ADMIN — CYANOCOBALAMIN 1000 MCG: 1000 INJECTION, SOLUTION INTRAMUSCULAR at 07:15

## 2020-10-13 NOTE — PROGRESS NOTES
Arrived to the [facility]. Assessment complete. Oral potassium and B12 injection completed. Patient tolerated without problems. Any issues or concerns during appointment: None. Patient aware of next infusion appointment on 10/14/2020 (date) at [address] [place] [address] (time). Discharged ambulatory.

## 2020-10-14 ENCOUNTER — HOSPITAL ENCOUNTER (OUTPATIENT)
Dept: INFUSION THERAPY | Age: 45
Discharge: HOME | End: 2020-10-14
Payer: COMMERCIAL

## 2020-10-14 ENCOUNTER — HOSPITAL ENCOUNTER (OUTPATIENT)
Dept: LAB | Age: 45
Discharge: HOME | End: 2020-10-14

## 2020-10-14 VITALS
SYSTOLIC BLOOD PRESSURE: 119 MMHG | TEMPERATURE: 97.5 F | WEIGHT: 190 LBS | DIASTOLIC BLOOD PRESSURE: 76 MMHG | RESPIRATION RATE: 18 BRPM | BODY MASS INDEX: 28.06 KG/M2 | HEART RATE: 69 BPM | OXYGEN SATURATION: 100 %

## 2020-10-14 DIAGNOSIS — E53.8 B12 DEFICIENCY: Primary | ICD-10-CM

## 2020-10-14 LAB — POTASSIUM SERPL-SCNC: 3.9 MMOL/L (ref 3.5–5.1)

## 2020-10-14 PROCEDURE — 84132 ASSAY OF SERUM POTASSIUM: CPT

## 2020-10-14 PROCEDURE — 96372 THER/PROPH/DIAG INJ SC/IM: CPT

## 2020-10-14 PROCEDURE — 36415 COLL VENOUS BLD VENIPUNCTURE: CPT

## 2020-10-14 PROCEDURE — 74011250637 HC RX REV CODE- 250/637: Performed by: INTERNAL MEDICINE

## 2020-10-14 PROCEDURE — 74011250636 HC RX REV CODE- 250/636: Performed by: INTERNAL MEDICINE

## 2020-10-14 RX ORDER — POTASSIUM CHLORIDE 750 MG/1
40 TABLET, EXTENDED RELEASE ORAL
Status: COMPLETED | OUTPATIENT
Start: 2020-10-14 | End: 2020-10-14

## 2020-10-14 RX ORDER — CYANOCOBALAMIN 1000 UG/ML
1000 INJECTION, SOLUTION INTRAMUSCULAR; SUBCUTANEOUS ONCE
Status: COMPLETED | OUTPATIENT
Start: 2020-10-14 | End: 2020-10-14

## 2020-10-14 RX ADMIN — POTASSIUM CHLORIDE 40 MEQ: 750 TABLET, EXTENDED RELEASE ORAL at 07:38

## 2020-10-14 RX ADMIN — CYANOCOBALAMIN 1000 MCG: 1000 INJECTION, SOLUTION INTRAMUSCULAR at 07:33

## 2020-10-14 NOTE — PROGRESS NOTES
Arrived to the [facility] ambulatory. VIT-B12, and 40mEq potassium completed. Patient tolerated well. Any issues or concerns during appointment: no, potassium level today=3.9. Patient aware of next infusion appointment on 10/15 at 0715. Discharged to home ambulatory.

## 2020-10-15 ENCOUNTER — HOSPITAL ENCOUNTER (OUTPATIENT)
Dept: INFUSION THERAPY | Age: 45
Discharge: HOME | End: 2020-10-15
Payer: COMMERCIAL

## 2020-10-15 VITALS
TEMPERATURE: 97.7 F | RESPIRATION RATE: 20 BRPM | OXYGEN SATURATION: 100 % | DIASTOLIC BLOOD PRESSURE: 77 MMHG | SYSTOLIC BLOOD PRESSURE: 122 MMHG | HEART RATE: 65 BPM

## 2020-10-15 DIAGNOSIS — E53.8 B12 DEFICIENCY: Primary | ICD-10-CM

## 2020-10-15 PROCEDURE — 74011250636 HC RX REV CODE- 250/636: Performed by: INTERNAL MEDICINE

## 2020-10-15 PROCEDURE — 74011250637 HC RX REV CODE- 250/637: Performed by: INTERNAL MEDICINE

## 2020-10-15 PROCEDURE — 96372 THER/PROPH/DIAG INJ SC/IM: CPT

## 2020-10-15 RX ORDER — CYANOCOBALAMIN 1000 UG/ML
1000 INJECTION, SOLUTION INTRAMUSCULAR; SUBCUTANEOUS ONCE
Status: COMPLETED | OUTPATIENT
Start: 2020-10-15 | End: 2020-10-15

## 2020-10-15 RX ORDER — FUROSEMIDE 10 MG/ML
20 INJECTION INTRAMUSCULAR; INTRAVENOUS ONCE
Status: DISCONTINUED | OUTPATIENT
Start: 2020-10-15 | End: 2020-10-15

## 2020-10-15 RX ORDER — POTASSIUM CHLORIDE 750 MG/1
40 TABLET, EXTENDED RELEASE ORAL
Status: COMPLETED | OUTPATIENT
Start: 2020-10-15 | End: 2020-10-15

## 2020-10-15 RX ADMIN — POTASSIUM CHLORIDE 40 MEQ: 750 TABLET, EXTENDED RELEASE ORAL at 07:26

## 2020-10-15 RX ADMIN — CYANOCOBALAMIN 1000 MCG: 1000 INJECTION, SOLUTION INTRAMUSCULAR at 07:26

## 2020-10-15 NOTE — PROGRESS NOTES
Pt arrived ambulatory, B12 and Potassium po given per order, pt tolerated well, discharged home ambulatory aware of appointment tomorrow at [facility]

## 2020-10-16 ENCOUNTER — HOSPITAL ENCOUNTER (OUTPATIENT)
Dept: INFUSION THERAPY | Age: 45
Discharge: HOME | End: 2020-10-16
Payer: COMMERCIAL

## 2020-10-16 VITALS
DIASTOLIC BLOOD PRESSURE: 63 MMHG | TEMPERATURE: 97.9 F | SYSTOLIC BLOOD PRESSURE: 111 MMHG | RESPIRATION RATE: 18 BRPM | OXYGEN SATURATION: 99 % | HEART RATE: 63 BPM

## 2020-10-16 DIAGNOSIS — E53.8 B12 DEFICIENCY: Primary | ICD-10-CM

## 2020-10-16 PROCEDURE — 74011250637 HC RX REV CODE- 250/637: Performed by: INTERNAL MEDICINE

## 2020-10-16 PROCEDURE — 96372 THER/PROPH/DIAG INJ SC/IM: CPT

## 2020-10-16 PROCEDURE — 74011250636 HC RX REV CODE- 250/636: Performed by: INTERNAL MEDICINE

## 2020-10-16 RX ORDER — POTASSIUM CHLORIDE 750 MG/1
40 TABLET, EXTENDED RELEASE ORAL
Status: COMPLETED | OUTPATIENT
Start: 2020-10-16 | End: 2020-10-16

## 2020-10-16 RX ORDER — CYANOCOBALAMIN 1000 UG/ML
1000 INJECTION, SOLUTION INTRAMUSCULAR; SUBCUTANEOUS ONCE
Status: COMPLETED | OUTPATIENT
Start: 2020-10-16 | End: 2020-10-16

## 2020-10-16 RX ADMIN — CYANOCOBALAMIN 1000 MCG: 1000 INJECTION, SOLUTION INTRAMUSCULAR at 07:26

## 2020-10-16 RX ADMIN — POTASSIUM CHLORIDE 40 MEQ: 750 TABLET, EXTENDED RELEASE ORAL at 07:22

## 2020-10-16 NOTE — PROGRESS NOTES
Patient here for vitamin B12 and potassium supplement. Reviewed the procedure and process with patient and he verbalizes understanding.

## 2020-10-16 NOTE — PROGRESS NOTES
Arrived to the [facility]. Assessment completed. Patient tolerated vitamin B12 injection and potassium (oral) supplement well. Any issues or concerns during appointment: none. Patient aware of next infusion appointment on 10/17/20 (date) at 1500 (time) with IV infusion center. Discharged ambulatory, per self. Patient instructed to call Dr. Jose Rowe office immediately for any problems or concerns. He verbalizes understanding.

## 2020-10-18 ENCOUNTER — HOSPITAL ENCOUNTER (OUTPATIENT)
Dept: INFUSION THERAPY | Age: 45
Discharge: HOME | End: 2020-10-18
Payer: COMMERCIAL

## 2020-10-18 VITALS
TEMPERATURE: 98 F | SYSTOLIC BLOOD PRESSURE: 122 MMHG | OXYGEN SATURATION: 100 % | DIASTOLIC BLOOD PRESSURE: 73 MMHG | RESPIRATION RATE: 18 BRPM | HEART RATE: 60 BPM

## 2020-10-18 DIAGNOSIS — E53.8 B12 DEFICIENCY: Primary | ICD-10-CM

## 2020-10-18 PROCEDURE — 74011250637 HC RX REV CODE- 250/637: Performed by: INTERNAL MEDICINE

## 2020-10-18 PROCEDURE — 96372 THER/PROPH/DIAG INJ SC/IM: CPT

## 2020-10-18 PROCEDURE — 74011250636 HC RX REV CODE- 250/636: Performed by: INTERNAL MEDICINE

## 2020-10-18 RX ORDER — POTASSIUM CHLORIDE 750 MG/1
40 TABLET, EXTENDED RELEASE ORAL
Status: COMPLETED | OUTPATIENT
Start: 2020-10-18 | End: 2020-10-18

## 2020-10-18 RX ORDER — CYANOCOBALAMIN 1000 UG/ML
1000 INJECTION, SOLUTION INTRAMUSCULAR; SUBCUTANEOUS ONCE
Status: COMPLETED | OUTPATIENT
Start: 2020-10-18 | End: 2020-10-18

## 2020-10-18 RX ADMIN — POTASSIUM CHLORIDE 40 MEQ: 750 TABLET, EXTENDED RELEASE ORAL at 10:51

## 2020-10-18 RX ADMIN — CYANOCOBALAMIN 1000 MCG: 1000 INJECTION, SOLUTION INTRAMUSCULAR at 10:51

## 2020-10-18 NOTE — PROGRESS NOTES
Arrived to the [facility]. Vitamin B12 injection and potassium supplement administered. Patient tolerated well. Any issues or concerns during appointment: none. Patient aware of next infusion appointment on 10/26 at   Discharged ambulatory to home.

## 2020-10-26 ENCOUNTER — HOSPITAL ENCOUNTER (OUTPATIENT)
Dept: LAB | Age: 45
Discharge: HOME | End: 2020-10-26
Payer: COMMERCIAL

## 2020-10-26 ENCOUNTER — HOSPITAL ENCOUNTER (OUTPATIENT)
Dept: INFUSION THERAPY | Age: 45
Discharge: HOME | End: 2020-10-26
Payer: COMMERCIAL

## 2020-10-26 DIAGNOSIS — E53.8 B12 DEFICIENCY: ICD-10-CM

## 2020-10-26 DIAGNOSIS — E53.8 B12 DEFICIENCY: Primary | ICD-10-CM

## 2020-10-26 LAB
ALBUMIN SERPL-MCNC: 4 G/DL (ref 3.5–5)
ALBUMIN/GLOB SERPL: 1.1 {RATIO} (ref 1.2–3.5)
ALP SERPL-CCNC: 92 U/L (ref 50–136)
ALT SERPL-CCNC: 21 U/L (ref 12–65)
ANION GAP SERPL CALC-SCNC: 5 MMOL/L (ref 7–16)
AST SERPL-CCNC: 19 U/L (ref 15–37)
BASOPHILS # BLD: 0 K/UL (ref 0–0.2)
BASOPHILS NFR BLD: 0 % (ref 0–2)
BILIRUB SERPL-MCNC: 1.4 MG/DL (ref 0.2–1.1)
BUN SERPL-MCNC: 8 MG/DL (ref 6–23)
CALCIUM SERPL-MCNC: 8.8 MG/DL (ref 8.3–10.4)
CHLORIDE SERPL-SCNC: 107 MMOL/L (ref 98–107)
CO2 SERPL-SCNC: 26 MMOL/L (ref 21–32)
CREAT SERPL-MCNC: 0.8 MG/DL (ref 0.8–1.5)
DIFFERENTIAL METHOD BLD: ABNORMAL
EOSINOPHIL # BLD: 0.5 K/UL (ref 0–0.8)
EOSINOPHIL NFR BLD: 9 % (ref 0.5–7.8)
ERYTHROCYTE [DISTWIDTH] IN BLOOD BY AUTOMATED COUNT: 20.2 % (ref 11.9–14.6)
GLOBULIN SER CALC-MCNC: 3.8 G/DL (ref 2.3–3.5)
GLUCOSE SERPL-MCNC: 112 MG/DL (ref 65–100)
HAPTOGLOB SERPL-MCNC: 68 MG/DL (ref 30–200)
HCT VFR BLD AUTO: 37.6 % (ref 41.1–50.3)
HGB BLD-MCNC: 12.6 G/DL (ref 13.6–17.2)
IMM GRANULOCYTES # BLD AUTO: 0 K/UL (ref 0–0.5)
IMM GRANULOCYTES NFR BLD AUTO: 1 % (ref 0–5)
LDH SERPL L TO P-CCNC: 247 U/L (ref 100–190)
LYMPHOCYTES # BLD: 1.3 K/UL (ref 0.5–4.6)
LYMPHOCYTES NFR BLD: 26 % (ref 13–44)
MCH RBC QN AUTO: 36.6 PG (ref 26.1–32.9)
MCHC RBC AUTO-ENTMCNC: 33.5 G/DL (ref 31.4–35)
MCV RBC AUTO: 109.3 FL (ref 79.6–97.8)
MONOCYTES # BLD: 0.6 K/UL (ref 0.1–1.3)
MONOCYTES NFR BLD: 11 % (ref 4–12)
NEUTS SEG # BLD: 2.7 K/UL (ref 1.7–8.2)
NEUTS SEG NFR BLD: 53 % (ref 43–78)
NRBC # BLD: 0 K/UL (ref 0–0.2)
PLATELET # BLD AUTO: 257 K/UL (ref 150–450)
PMV BLD AUTO: 9.7 FL (ref 9.4–12.3)
POTASSIUM SERPL-SCNC: 3.9 MMOL/L (ref 3.5–5.1)
PROT SERPL-MCNC: 7.8 G/DL (ref 6.3–8.2)
RBC # BLD AUTO: 3.44 M/UL (ref 4.23–5.67)
SODIUM SERPL-SCNC: 138 MMOL/L (ref 136–145)
VIT B12 SERPL-MCNC: 480 PG/ML (ref 193–986)
WBC # BLD AUTO: 5.1 K/UL (ref 4.3–11.1)

## 2020-10-26 PROCEDURE — 85025 COMPLETE CBC W/AUTO DIFF WBC: CPT

## 2020-10-26 PROCEDURE — 36415 COLL VENOUS BLD VENIPUNCTURE: CPT

## 2020-10-26 PROCEDURE — 82607 VITAMIN B-12: CPT

## 2020-10-26 PROCEDURE — 83010 ASSAY OF HAPTOGLOBIN QUANT: CPT

## 2020-10-26 PROCEDURE — 74011250637 HC RX REV CODE- 250/637: Performed by: INTERNAL MEDICINE

## 2020-10-26 PROCEDURE — 83615 LACTATE (LD) (LDH) ENZYME: CPT

## 2020-10-26 PROCEDURE — 80053 COMPREHEN METABOLIC PANEL: CPT

## 2020-10-26 PROCEDURE — 74011250636 HC RX REV CODE- 250/636: Performed by: INTERNAL MEDICINE

## 2020-10-26 PROCEDURE — 96372 THER/PROPH/DIAG INJ SC/IM: CPT

## 2020-10-26 RX ORDER — POTASSIUM CHLORIDE 750 MG/1
40 TABLET, EXTENDED RELEASE ORAL
Status: COMPLETED | OUTPATIENT
Start: 2020-10-26 | End: 2020-10-26

## 2020-10-26 RX ORDER — CYANOCOBALAMIN 1000 UG/ML
1000 INJECTION, SOLUTION INTRAMUSCULAR; SUBCUTANEOUS ONCE
Status: COMPLETED | OUTPATIENT
Start: 2020-10-26 | End: 2020-10-26

## 2020-10-26 RX ADMIN — POTASSIUM CHLORIDE 40 MEQ: 750 TABLET, EXTENDED RELEASE ORAL at 16:29

## 2020-10-26 RX ADMIN — CYANOCOBALAMIN 1000 MCG: 1000 INJECTION, SOLUTION INTRAMUSCULAR at 16:29

## 2020-11-02 ENCOUNTER — HOSPITAL ENCOUNTER (OUTPATIENT)
Dept: INFUSION THERAPY | Age: 45
Discharge: HOME | End: 2020-11-02
Payer: COMMERCIAL

## 2020-11-02 VITALS
DIASTOLIC BLOOD PRESSURE: 75 MMHG | RESPIRATION RATE: 18 BRPM | HEART RATE: 62 BPM | TEMPERATURE: 96.8 F | OXYGEN SATURATION: 100 % | SYSTOLIC BLOOD PRESSURE: 126 MMHG

## 2020-11-02 DIAGNOSIS — E53.8 B12 DEFICIENCY: Primary | ICD-10-CM

## 2020-11-02 PROCEDURE — 74011250636 HC RX REV CODE- 250/636: Performed by: INTERNAL MEDICINE

## 2020-11-02 PROCEDURE — 96372 THER/PROPH/DIAG INJ SC/IM: CPT

## 2020-11-02 PROCEDURE — 74011250637 HC RX REV CODE- 250/637: Performed by: INTERNAL MEDICINE

## 2020-11-02 RX ORDER — CYANOCOBALAMIN 1000 UG/ML
1000 INJECTION, SOLUTION INTRAMUSCULAR; SUBCUTANEOUS ONCE
Status: COMPLETED | OUTPATIENT
Start: 2020-11-02 | End: 2020-11-02

## 2020-11-02 RX ORDER — POTASSIUM CHLORIDE 750 MG/1
40 TABLET, EXTENDED RELEASE ORAL
Status: COMPLETED | OUTPATIENT
Start: 2020-11-02 | End: 2020-11-02

## 2020-11-02 RX ADMIN — CYANOCOBALAMIN 1000 MCG: 1000 INJECTION, SOLUTION INTRAMUSCULAR; SUBCUTANEOUS at 07:23

## 2020-11-02 RX ADMIN — POTASSIUM CHLORIDE 40 MEQ: 750 TABLET, EXTENDED RELEASE ORAL at 07:23

## 2020-11-02 NOTE — PROGRESS NOTES
Arrived to the [facility] ambulatory. KCL and VIT B12 completed. Patient tolerated well. Any issues or concerns during appointment: no.  Patient aware of next infusion appointment on  11/9 at [address] [place] [address]. Discharged to home ambulatory.

## 2020-11-09 ENCOUNTER — HOSPITAL ENCOUNTER (OUTPATIENT)
Dept: INFUSION THERAPY | Age: 45
Discharge: HOME | End: 2020-11-09
Payer: COMMERCIAL

## 2020-11-09 VITALS
SYSTOLIC BLOOD PRESSURE: 115 MMHG | OXYGEN SATURATION: 100 % | HEART RATE: 53 BPM | RESPIRATION RATE: 18 BRPM | TEMPERATURE: 97.3 F | DIASTOLIC BLOOD PRESSURE: 76 MMHG

## 2020-11-09 DIAGNOSIS — E53.8 B12 DEFICIENCY: Primary | ICD-10-CM

## 2020-11-09 PROCEDURE — 74011250636 HC RX REV CODE- 250/636: Performed by: INTERNAL MEDICINE

## 2020-11-09 PROCEDURE — 96372 THER/PROPH/DIAG INJ SC/IM: CPT

## 2020-11-09 RX ORDER — CYANOCOBALAMIN 1000 UG/ML
1000 INJECTION, SOLUTION INTRAMUSCULAR; SUBCUTANEOUS ONCE
Status: COMPLETED | OUTPATIENT
Start: 2020-11-09 | End: 2020-11-09

## 2020-11-09 RX ADMIN — CYANOCOBALAMIN 1000 MCG: 1000 INJECTION, SOLUTION INTRAMUSCULAR; SUBCUTANEOUS at 07:21

## 2020-11-09 NOTE — PROGRESS NOTES
Pt. Discharged ambulatory. Tolerated injection well. No distress noted. To call physician with any problems or concerns. Understanding voiced. To return to Infusions on 11/16/20.

## 2020-11-18 ENCOUNTER — HOSPITAL ENCOUNTER (OUTPATIENT)
Dept: INFUSION THERAPY | Age: 45
Discharge: HOME | End: 2020-11-18
Payer: COMMERCIAL

## 2020-11-18 VITALS
TEMPERATURE: 98.1 F | RESPIRATION RATE: 18 BRPM | HEART RATE: 78 BPM | SYSTOLIC BLOOD PRESSURE: 132 MMHG | DIASTOLIC BLOOD PRESSURE: 72 MMHG | OXYGEN SATURATION: 98 %

## 2020-11-18 DIAGNOSIS — E53.8 B12 DEFICIENCY: Primary | ICD-10-CM

## 2020-11-18 PROCEDURE — 74011250636 HC RX REV CODE- 250/636: Performed by: INTERNAL MEDICINE

## 2020-11-18 PROCEDURE — 96372 THER/PROPH/DIAG INJ SC/IM: CPT

## 2020-11-18 RX ORDER — CYANOCOBALAMIN 1000 UG/ML
1000 INJECTION, SOLUTION INTRAMUSCULAR; SUBCUTANEOUS ONCE
Status: COMPLETED | OUTPATIENT
Start: 2020-11-18 | End: 2020-11-18

## 2020-11-18 RX ADMIN — CYANOCOBALAMIN 1000 MCG: 1000 INJECTION, SOLUTION INTRAMUSCULAR at 07:25

## 2020-11-18 NOTE — PROGRESS NOTES
Arrived to the [facility]. B12 injection completed. Patient tolerated well. Any issues or concerns during appointment: none. Discharged ambulatory.     Liliana Siegel RN

## 2020-12-21 ENCOUNTER — HOSPITAL ENCOUNTER (OUTPATIENT)
Dept: LAB | Age: 45
Discharge: HOME | End: 2020-12-21
Payer: COMMERCIAL

## 2020-12-21 ENCOUNTER — HOSPITAL ENCOUNTER (OUTPATIENT)
Dept: INFUSION THERAPY | Age: 45
Discharge: HOME | End: 2020-12-21
Payer: COMMERCIAL

## 2020-12-21 DIAGNOSIS — E53.8 B12 DEFICIENCY: ICD-10-CM

## 2020-12-21 DIAGNOSIS — E53.8 B12 DEFICIENCY: Primary | ICD-10-CM

## 2020-12-21 LAB
ALBUMIN SERPL-MCNC: 4 G/DL (ref 3.5–5)
ALBUMIN/GLOB SERPL: 1 {RATIO} (ref 1.2–3.5)
ALP SERPL-CCNC: 101 U/L (ref 50–136)
ALT SERPL-CCNC: 25 U/L (ref 12–65)
ANION GAP SERPL CALC-SCNC: 5 MMOL/L (ref 7–16)
AST SERPL-CCNC: 17 U/L (ref 15–37)
BASOPHILS # BLD: 0 K/UL (ref 0–0.2)
BASOPHILS NFR BLD: 0 % (ref 0–2)
BILIRUB SERPL-MCNC: 0.5 MG/DL (ref 0.2–1.1)
BUN SERPL-MCNC: 12 MG/DL (ref 6–23)
CALCIUM SERPL-MCNC: 9.3 MG/DL (ref 8.3–10.4)
CHLORIDE SERPL-SCNC: 107 MMOL/L (ref 98–107)
CO2 SERPL-SCNC: 27 MMOL/L (ref 21–32)
CREAT SERPL-MCNC: 0.8 MG/DL (ref 0.8–1.5)
DIFFERENTIAL METHOD BLD: ABNORMAL
EOSINOPHIL # BLD: 0.4 K/UL (ref 0–0.8)
EOSINOPHIL NFR BLD: 7 % (ref 0.5–7.8)
ERYTHROCYTE [DISTWIDTH] IN BLOOD BY AUTOMATED COUNT: 14.7 % (ref 11.9–14.6)
GLOBULIN SER CALC-MCNC: 4.1 G/DL (ref 2.3–3.5)
GLUCOSE SERPL-MCNC: 83 MG/DL (ref 65–100)
HCT VFR BLD AUTO: 47.7 % (ref 41.1–50.3)
HGB BLD-MCNC: 16 G/DL (ref 13.6–17.2)
IMM GRANULOCYTES # BLD AUTO: 0 K/UL (ref 0–0.5)
IMM GRANULOCYTES NFR BLD AUTO: 0 % (ref 0–5)
LDH SERPL L TO P-CCNC: 215 U/L (ref 100–190)
LYMPHOCYTES # BLD: 1.5 K/UL (ref 0.5–4.6)
LYMPHOCYTES NFR BLD: 29 % (ref 13–44)
MCH RBC QN AUTO: 31.8 PG (ref 26.1–32.9)
MCHC RBC AUTO-ENTMCNC: 33.5 G/DL (ref 31.4–35)
MCV RBC AUTO: 94.8 FL (ref 79.6–97.8)
MONOCYTES # BLD: 0.8 K/UL (ref 0.1–1.3)
MONOCYTES NFR BLD: 16 % (ref 4–12)
NEUTS SEG # BLD: 2.4 K/UL (ref 1.7–8.2)
NEUTS SEG NFR BLD: 47 % (ref 43–78)
NRBC # BLD: 0 K/UL (ref 0–0.2)
PLATELET # BLD AUTO: 233 K/UL (ref 150–450)
PMV BLD AUTO: 9.1 FL (ref 9.4–12.3)
POTASSIUM SERPL-SCNC: 4.6 MMOL/L (ref 3.5–5.1)
PROT SERPL-MCNC: 8.1 G/DL (ref 6.3–8.2)
RBC # BLD AUTO: 5.03 M/UL (ref 4.23–5.67)
SODIUM SERPL-SCNC: 139 MMOL/L (ref 136–145)
VIT B12 SERPL-MCNC: 329 PG/ML (ref 193–986)
WBC # BLD AUTO: 5 K/UL (ref 4.3–11.1)

## 2020-12-21 PROCEDURE — 82607 VITAMIN B-12: CPT

## 2020-12-21 PROCEDURE — 74011250636 HC RX REV CODE- 250/636: Performed by: INTERNAL MEDICINE

## 2020-12-21 PROCEDURE — 96372 THER/PROPH/DIAG INJ SC/IM: CPT

## 2020-12-21 PROCEDURE — 83615 LACTATE (LD) (LDH) ENZYME: CPT

## 2020-12-21 PROCEDURE — 36415 COLL VENOUS BLD VENIPUNCTURE: CPT

## 2020-12-21 PROCEDURE — 85025 COMPLETE CBC W/AUTO DIFF WBC: CPT

## 2020-12-21 PROCEDURE — 80053 COMPREHEN METABOLIC PANEL: CPT

## 2020-12-21 RX ORDER — CYANOCOBALAMIN 1000 UG/ML
1000 INJECTION, SOLUTION INTRAMUSCULAR; SUBCUTANEOUS ONCE
Status: COMPLETED | OUTPATIENT
Start: 2020-12-21 | End: 2020-12-21

## 2020-12-21 RX ADMIN — CYANOCOBALAMIN 1000 MCG: 1000 INJECTION, SOLUTION INTRAMUSCULAR; SUBCUTANEOUS at 16:15

## 2020-12-21 NOTE — PROGRESS NOTES
Arrived to the [facility]. Vitamin B12 completed. Provided education on Vitamin B12  Patient instructed to report any side affects to ordering provider.   Patient tolerated well  Any issues or concerns during appointment: No  Patient aware of next infusion appointment on Monday,January 18,2021 @ 12  Discharged home ambulatory

## 2021-01-18 ENCOUNTER — HOSPITAL ENCOUNTER (OUTPATIENT)
Dept: INFUSION THERAPY | Age: 46
Discharge: HOME | End: 2021-01-18
Payer: COMMERCIAL

## 2021-01-18 VITALS
SYSTOLIC BLOOD PRESSURE: 142 MMHG | HEART RATE: 76 BPM | TEMPERATURE: 98.1 F | DIASTOLIC BLOOD PRESSURE: 90 MMHG | OXYGEN SATURATION: 97 % | RESPIRATION RATE: 18 BRPM

## 2021-01-18 DIAGNOSIS — E53.8 B12 DEFICIENCY: Primary | ICD-10-CM

## 2021-01-18 PROCEDURE — 74011250636 HC RX REV CODE- 250/636: Performed by: INTERNAL MEDICINE

## 2021-01-18 PROCEDURE — 96372 THER/PROPH/DIAG INJ SC/IM: CPT

## 2021-01-18 RX ORDER — CYANOCOBALAMIN 1000 UG/ML
1000 INJECTION, SOLUTION INTRAMUSCULAR; SUBCUTANEOUS ONCE
Status: COMPLETED | OUTPATIENT
Start: 2021-01-18 | End: 2021-01-18

## 2021-01-18 RX ADMIN — CYANOCOBALAMIN 1000 MCG: 1000 INJECTION, SOLUTION INTRAMUSCULAR; SUBCUTANEOUS at 07:16

## 2021-01-18 NOTE — PROGRESS NOTES
Arrived to the [facility]. Vitamin B12 injection completed. Patient tolerated well. Any issues or concerns during appointment: none. Discharged ambulatory.

## 2021-03-04 ENCOUNTER — APPOINTMENT (OUTPATIENT)
Dept: GENERAL RADIOLOGY | Age: 46
End: 2021-03-04
Attending: EMERGENCY MEDICINE
Payer: COMMERCIAL

## 2021-03-04 ENCOUNTER — HOSPITAL ENCOUNTER (EMERGENCY)
Age: 46
Discharge: HOME | End: 2021-03-04
Attending: EMERGENCY MEDICINE
Payer: COMMERCIAL

## 2021-03-04 VITALS
WEIGHT: 197 LBS | SYSTOLIC BLOOD PRESSURE: 132 MMHG | TEMPERATURE: 98.7 F | HEIGHT: 68 IN | DIASTOLIC BLOOD PRESSURE: 94 MMHG | BODY MASS INDEX: 29.86 KG/M2 | OXYGEN SATURATION: 99 % | RESPIRATION RATE: 17 BRPM | HEART RATE: 62 BPM

## 2021-03-04 DIAGNOSIS — R00.2 PALPITATIONS: Primary | ICD-10-CM

## 2021-03-04 DIAGNOSIS — I48.0 PAROXYSMAL ATRIAL FIBRILLATION (HCC): ICD-10-CM

## 2021-03-04 LAB
ALBUMIN SERPL-MCNC: 3.7 G/DL (ref 3.5–5)
ALBUMIN/GLOB SERPL: 0.9 {RATIO} (ref 1.2–3.5)
ALP SERPL-CCNC: 105 U/L (ref 50–136)
ALT SERPL-CCNC: 20 U/L (ref 12–65)
ANION GAP SERPL CALC-SCNC: 6 MMOL/L (ref 7–16)
AST SERPL-CCNC: 18 U/L (ref 15–37)
ATRIAL RATE: 79 BPM
BASOPHILS # BLD: 0 K/UL (ref 0–0.2)
BASOPHILS NFR BLD: 0 % (ref 0–2)
BILIRUB SERPL-MCNC: 1.6 MG/DL (ref 0.2–1.1)
BUN SERPL-MCNC: 8 MG/DL (ref 6–23)
CALCIUM SERPL-MCNC: 8.7 MG/DL (ref 8.3–10.4)
CALCULATED P AXIS, ECG09: 80 DEGREES
CALCULATED R AXIS, ECG10: 33 DEGREES
CALCULATED T AXIS, ECG11: 70 DEGREES
CHLORIDE SERPL-SCNC: 107 MMOL/L (ref 98–107)
CO2 SERPL-SCNC: 27 MMOL/L (ref 21–32)
CREAT SERPL-MCNC: 0.95 MG/DL (ref 0.8–1.5)
DIAGNOSIS, 93000: NORMAL
DIFFERENTIAL METHOD BLD: ABNORMAL
EOSINOPHIL # BLD: 0.3 K/UL (ref 0–0.8)
EOSINOPHIL NFR BLD: 6 % (ref 0.5–7.8)
ERYTHROCYTE [DISTWIDTH] IN BLOOD BY AUTOMATED COUNT: 14.1 % (ref 11.9–14.6)
GLOBULIN SER CALC-MCNC: 4.2 G/DL (ref 2.3–3.5)
GLUCOSE SERPL-MCNC: 119 MG/DL (ref 65–100)
HCT VFR BLD AUTO: 51.7 % (ref 41.1–50.3)
HGB BLD-MCNC: 16.9 G/DL (ref 13.6–17.2)
IMM GRANULOCYTES # BLD AUTO: 0 K/UL (ref 0–0.5)
IMM GRANULOCYTES NFR BLD AUTO: 0 % (ref 0–5)
LYMPHOCYTES # BLD: 1.5 K/UL (ref 0.5–4.6)
LYMPHOCYTES NFR BLD: 29 % (ref 13–44)
MAGNESIUM SERPL-MCNC: 2.2 MG/DL (ref 1.8–2.4)
MCH RBC QN AUTO: 28.8 PG (ref 26.1–32.9)
MCHC RBC AUTO-ENTMCNC: 32.7 G/DL (ref 31.4–35)
MCV RBC AUTO: 88.1 FL (ref 79.6–97.8)
MONOCYTES # BLD: 0.8 K/UL (ref 0.1–1.3)
MONOCYTES NFR BLD: 15 % (ref 4–12)
NEUTS SEG # BLD: 2.7 K/UL (ref 1.7–8.2)
NEUTS SEG NFR BLD: 51 % (ref 43–78)
NRBC # BLD: 0 K/UL (ref 0–0.2)
P-R INTERVAL, ECG05: 140 MS
PLATELET # BLD AUTO: 246 K/UL (ref 150–450)
PMV BLD AUTO: 9.3 FL (ref 9.4–12.3)
POTASSIUM SERPL-SCNC: 3.7 MMOL/L (ref 3.5–5.1)
PROT SERPL-MCNC: 7.9 G/DL (ref 6.3–8.2)
Q-T INTERVAL, ECG07: 352 MS
QRS DURATION, ECG06: 80 MS
QTC CALCULATION (BEZET), ECG08: 403 MS
RBC # BLD AUTO: 5.87 M/UL (ref 4.23–5.6)
SODIUM SERPL-SCNC: 140 MMOL/L (ref 138–145)
T4 FREE SERPL-MCNC: 0.7 NG/DL (ref 0.78–1.46)
TROPONIN-HIGH SENSITIVITY: 6 PG/ML (ref 0–14)
TSH SERPL DL<=0.005 MIU/L-ACNC: 14.7 UIU/ML (ref 0.36–3.74)
VENTRICULAR RATE, ECG03: 79 BPM
WBC # BLD AUTO: 5.3 K/UL (ref 4.3–11.1)

## 2021-03-04 PROCEDURE — 71045 X-RAY EXAM CHEST 1 VIEW: CPT

## 2021-03-04 PROCEDURE — 84484 ASSAY OF TROPONIN QUANT: CPT

## 2021-03-04 PROCEDURE — 99284 EMERGENCY DEPT VISIT MOD MDM: CPT

## 2021-03-04 PROCEDURE — 85025 COMPLETE CBC W/AUTO DIFF WBC: CPT

## 2021-03-04 PROCEDURE — 93005 ELECTROCARDIOGRAM TRACING: CPT | Performed by: EMERGENCY MEDICINE

## 2021-03-04 PROCEDURE — 84443 ASSAY THYROID STIM HORMONE: CPT

## 2021-03-04 PROCEDURE — 84439 ASSAY OF FREE THYROXINE: CPT

## 2021-03-04 PROCEDURE — 83735 ASSAY OF MAGNESIUM: CPT

## 2021-03-04 PROCEDURE — 80053 COMPREHEN METABOLIC PANEL: CPT

## 2021-03-04 NOTE — ED PROVIDER NOTES
Patient presents the ER complaint of lightheadedness, shortness of breath and chest discomfort. Patient states symptoms started yesterday when he felt as if his heart was racing. States he has history of A. fib, states his pulse got up to the 90s. Reports he has had some recurrent episodes of lightheadedness associated with palpitations. Denies any fevers or chills. States he has a history of B12 deficiency and anemia. Denies any blood in his stools or blood in his vomit. The history is provided by the patient. Palpitations   This is a recurrent problem. The current episode started more than 2 days ago. The problem has not changed since onset. Associated symptoms include malaise/fatigue, irregular heartbeat, weakness and shortness of breath. Pertinent negatives include no diaphoresis, no fever, no numbness, no near-syncope, no abdominal pain, no vomiting, no back pain, no cough and no sputum production. Risk factors include cardiac disease. The treatment provided mild relief. His past medical history is significant for anemia and atrial fibrillation. His past medical history does not include valve disorder or drug use. Past Medical History:   Diagnosis Date    B12 deficiency 10/8/2020    Coronary artery disease involving native coronary artery of native heart with unstable angina pectoris ([facility] [address] 1/12/2017    Other ill-defined conditions(799.89)     kidney stones       No past surgical history on file.       Family History:   Problem Relation Age of Onset    No Known Problems Mother     Heart Disease Father     Heart Disease Brother     Heart Disease Paternal Grandfather        Social History     Socioeconomic History    Marital status:      Spouse name: Not on file    Number of children: Not on file    Years of education: Not on file    Highest education level: Not on file   Occupational History    Not on file   Social Needs    Financial resource strain: Not on file   [employer] insecurity     Worry: Not on file     Inability: Not on file    Transportation needs     Medical: Not on file     Non-medical: Not on file   Tobacco Use    Smoking status: Never Smoker    Smokeless tobacco: Never Used   Substance and Sexual Activity    Alcohol use: No    Drug use: No    Sexual activity: Not on file   Lifestyle    Physical activity     Days per week: Not on file     Minutes per session: Not on file    Stress: Not on file   Relationships    Social connections     Talks on phone: Not on file     Gets together: Not on file     Attends [employer] service: Not on file     Active member of club or organization: Not on file     Attends meetings of clubs or organizations: Not on file     Relationship status: Not on file    Intimate partner violence     Fear of current or ex partner: Not on file     Emotionally abused: Not on file     Physically abused: Not on file     Forced sexual activity: Not on file   Other Topics Concern    Not on file   Social History Narrative    Not on file         ALLERGIES: Patient has no known allergies. Review of Systems   Constitutional: Positive for fatigue and malaise/fatigue. Negative for diaphoresis, fever and unexpected weight change. HENT: Negative for congestion, trouble swallowing and voice change. Eyes: Negative for photophobia, redness and visual disturbance. Respiratory: Positive for chest tightness and shortness of breath. Negative for cough and sputum production. Cardiovascular: Positive for palpitations. Negative for near-syncope. Gastrointestinal: Negative for abdominal pain, blood in stool and vomiting. Endocrine: Negative for polyphagia and polyuria. Genitourinary: Negative for flank pain and hematuria. Musculoskeletal: Negative for back pain and gait problem. Skin: Negative for color change, pallor and rash. Neurological: Positive for weakness. Negative for speech difficulty and numbness.    Hematological: Negative for adenopathy. Does not bruise/bleed easily. Psychiatric/Behavioral: Negative for behavioral problems and confusion. Vitals:    03/04/21 0646   BP: (!) 150/110   Pulse: 84   Resp: 16   Temp: 98.7 °F (37.1 °C)   SpO2: 98%   Weight: 89.4 kg (197 lb)   Height: 5' 8\" (1.727 m)            Physical Exam  Vitals signs and nursing note reviewed. Constitutional:       General: He is not in acute distress. Appearance: Normal appearance. He is not ill-appearing. HENT:      Head: Normocephalic and atraumatic. Nose: Nose normal. No congestion or rhinorrhea. Mouth/Throat:      Mouth: Mucous membranes are moist.      Pharynx: No oropharyngeal exudate or posterior oropharyngeal erythema. Eyes:      General:         Right eye: No discharge. Left eye: No discharge. Extraocular Movements: Extraocular movements intact. Pupils: Pupils are equal, round, and reactive to light. Neck:      Musculoskeletal: Normal range of motion and neck supple. No neck rigidity or muscular tenderness. Cardiovascular:      Rate and Rhythm: Normal rate and regular rhythm. Pulses: Normal pulses. Heart sounds: Normal heart sounds. No murmur. Pulmonary:      Effort: Pulmonary effort is normal. No respiratory distress. Breath sounds: Normal breath sounds. No stridor. No wheezing or rhonchi. Abdominal:      General: Abdomen is flat. There is no distension. Palpations: Abdomen is soft. There is no mass. Tenderness: There is no abdominal tenderness. Hernia: No hernia is present. Musculoskeletal:         General: No swelling, tenderness, deformity or signs of injury. Skin:     General: Skin is warm and dry. Coloration: Skin is not jaundiced or pale. Findings: No bruising. Neurological:      General: No focal deficit present. Mental Status: He is alert and oriented to person, place, and time. Cranial Nerves: No cranial nerve deficit.       Sensory: No sensory deficit. Psychiatric:         Mood and Affect: Mood normal.         Behavior: Behavior normal.          MDM  Number of Diagnoses or Management Options  Palpitations  Diagnosis management comments: Will check basic labs and electrolytes here. Appears in a normal sinus rhythm currently. 8:51 AM  Labs are stable. EKG reveals a sinus rhythm without any ischemic changes. Patient is maintaining a normal sinus rhythm here    I do not feel he needs serial cardiac enzymes as this symptoms started since yesterday. We will discharge, have him follow-up with his primary cardiologist.       Amount and/or Complexity of Data Reviewed  Clinical lab tests: ordered and reviewed  Tests in the radiology section of CPT®: ordered and reviewed  Review and summarize past medical records: yes  Independent visualization of images, tracings, or specimens: yes (EKG interpretation: Sinus rhythm, rate of 89, normal axis, no blocks, no acute ischemic changes, no acute changes in comparison to previous EKG performed in September 2020)    Risk of Complications, Morbidity, and/or Mortality  Presenting problems: moderate  Diagnostic procedures: low  Management options: moderate           Procedures          Results Include:    Recent Results (from the past 24 hour(s))   CBC WITH AUTOMATED DIFF    Collection Time: 03/04/21  6:50 AM   Result Value Ref Range    WBC 5.3 4.3 - 11.1 K/uL    RBC 5.87 (H) 4.23 - 5.6 M/uL    HGB 16.9 13.6 - 17.2 g/dL    HCT 51.7 (H) 41.1 - 50.3 %    MCV 88.1 79.6 - 97.8 FL    MCH 28.8 26.1 - 32.9 PG    MCHC 32.7 31.4 - 35.0 g/dL    RDW 14.1 11.9 - 14.6 %    PLATELET 861 296 - 036 K/uL    MPV 9.3 (L) 9.4 - 12.3 FL    ABSOLUTE NRBC 0.00 0.0 - 0.2 K/uL    DF AUTOMATED      NEUTROPHILS 51 43 - 78 %    LYMPHOCYTES 29 13 - 44 %    MONOCYTES 15 (H) 4.0 - 12.0 %    EOSINOPHILS 6 0.5 - 7.8 %    BASOPHILS 0 0.0 - 2.0 %    IMMATURE GRANULOCYTES 0 0.0 - 5.0 %    ABS. NEUTROPHILS 2.7 1.7 - 8.2 K/UL    ABS.  LYMPHOCYTES 1.5 0.5 - 4.6 K/UL    ABS. MONOCYTES 0.8 0.1 - 1.3 K/UL    ABS. EOSINOPHILS 0.3 0.0 - 0.8 K/UL    ABS. BASOPHILS 0.0 0.0 - 0.2 K/UL    ABS. IMM. GRANS. 0.0 0.0 - 0.5 K/UL   METABOLIC PANEL, COMPREHENSIVE    Collection Time: 03/04/21  6:50 AM   Result Value Ref Range    Sodium 140 138 - 145 mmol/L    Potassium 3.7 3.5 - 5.1 mmol/L    Chloride 107 98 - 107 mmol/L    CO2 27 21 - 32 mmol/L    Anion gap 6 (L) 7 - 16 mmol/L    Glucose 119 (H) 65 - 100 mg/dL    BUN 8 6 - 23 MG/DL    Creatinine 0.95 0.8 - 1.5 MG/DL    GFR est AA >60 >60 ml/min/1.73m2    GFR est non-AA >60 >60 ml/min/1.73m2    Calcium 8.7 8.3 - 10.4 MG/DL    Bilirubin, total 1.6 (H) 0.2 - 1.1 MG/DL    ALT (SGPT) 20 12 - 65 U/L    AST (SGOT) 18 15 - 37 U/L    Alk. phosphatase 105 50 - 136 U/L    Protein, total 7.9 6.3 - 8.2 g/dL    Albumin 3.7 3.5 - 5.0 g/dL    Globulin 4.2 (H) 2.3 - 3.5 g/dL    A-G Ratio 0.9 (L) 1.2 - 3.5     MAGNESIUM    Collection Time: 03/04/21  6:50 AM   Result Value Ref Range    Magnesium 2.2 1.8 - 2.4 mg/dL   TROPONIN-HIGH SENSITIVITY    Collection Time: 03/04/21  6:50 AM   Result Value Ref Range    Troponin-High Sensitivity 6.0 0 - 14 pg/mL   TSH 3RD GENERATION    Collection Time: 03/04/21  6:50 AM   Result Value Ref Range    TSH 14.700 (H) 0.358 - 3.740 uIU/mL     Voice dictation software was used during the making of this note. This software is not perfect and grammatical and other typographical errors may be present. This note has been proofread, but may still contain errors.   Bonnie Flor MD; 3/4/2021 @8:52 AM   ===================================================================

## 2021-03-04 NOTE — DISCHARGE INSTRUCTIONS
Arrange follow-up with your primary cardiologist  Return to the ER for any new, worsening or life-threatening symptoms

## 2021-03-04 NOTE — ED TRIAGE NOTES
Pt arrives ambulatory to bed A, mask on.  Pt states that he feels like he is in a fib and that he feels like his heart is racing that started yesterday.  He states he has a history of a fib.  Pt states he has pain off and on in his chest.

## 2021-03-15 ENCOUNTER — HOSPITAL ENCOUNTER (OUTPATIENT)
Dept: INFUSION THERAPY | Age: 46
Discharge: HOME | End: 2021-03-15
Payer: COMMERCIAL

## 2021-03-15 VITALS
HEART RATE: 62 BPM | DIASTOLIC BLOOD PRESSURE: 82 MMHG | RESPIRATION RATE: 16 BRPM | SYSTOLIC BLOOD PRESSURE: 119 MMHG | OXYGEN SATURATION: 97 % | TEMPERATURE: 98 F

## 2021-03-15 DIAGNOSIS — E53.8 B12 DEFICIENCY: Primary | ICD-10-CM

## 2021-03-15 PROCEDURE — 96372 THER/PROPH/DIAG INJ SC/IM: CPT

## 2021-03-15 PROCEDURE — 74011250636 HC RX REV CODE- 250/636: Performed by: INTERNAL MEDICINE

## 2021-03-15 RX ORDER — CYANOCOBALAMIN 1000 UG/ML
1000 INJECTION, SOLUTION INTRAMUSCULAR; SUBCUTANEOUS ONCE
Status: COMPLETED | OUTPATIENT
Start: 2021-03-15 | End: 2021-03-15

## 2021-03-15 RX ADMIN — CYANOCOBALAMIN 1000 MCG: 1000 INJECTION, SOLUTION INTRAMUSCULAR; SUBCUTANEOUS at 07:23

## 2021-03-15 NOTE — PROGRESS NOTES
Arrived to the [facility]. B12 injection completed. Provided education on IM injections/site monitoring. Patient instructed to report any side affects to ordering provider. Patient tolerated well. Any issues or concerns during appointment: none. Patient aware of next infusion appointment on 4/12 at 7:15am.  Discharged ambulatory to home.

## 2021-04-18 ENCOUNTER — HOSPITAL ENCOUNTER (OUTPATIENT)
Dept: INFUSION THERAPY | Age: 46
Discharge: HOME | End: 2021-04-18
Payer: COMMERCIAL

## 2021-04-18 VITALS
RESPIRATION RATE: 18 BRPM | OXYGEN SATURATION: 96 % | DIASTOLIC BLOOD PRESSURE: 76 MMHG | TEMPERATURE: 97.7 F | SYSTOLIC BLOOD PRESSURE: 140 MMHG

## 2021-04-18 DIAGNOSIS — E53.8 B12 DEFICIENCY: Primary | ICD-10-CM

## 2021-04-18 PROCEDURE — 96372 THER/PROPH/DIAG INJ SC/IM: CPT

## 2021-04-18 PROCEDURE — 74011250636 HC RX REV CODE- 250/636: Performed by: INTERNAL MEDICINE

## 2021-04-18 RX ORDER — CYANOCOBALAMIN 1000 UG/ML
1000 INJECTION, SOLUTION INTRAMUSCULAR; SUBCUTANEOUS ONCE
Status: COMPLETED | OUTPATIENT
Start: 2021-04-18 | End: 2021-04-18

## 2021-04-18 RX ADMIN — CYANOCOBALAMIN 1000 MCG: 1000 INJECTION, SOLUTION INTRAMUSCULAR; SUBCUTANEOUS at 11:54

## 2021-04-18 NOTE — PROGRESS NOTES
Arrived to the [facility]. B12 completed. Provided education on B12    Patient instructed to report any side affects to ordering provider. Patient tolerated without problesm.    Any issues or concerns during appointment: no.  Patient requests  call him to change next appointment  Discharged ambulatory

## 2021-06-13 ENCOUNTER — HOSPITAL ENCOUNTER (OUTPATIENT)
Dept: INFUSION THERAPY | Age: 46
Discharge: HOME | End: 2021-06-13
Payer: COMMERCIAL

## 2021-06-13 VITALS
RESPIRATION RATE: 18 BRPM | TEMPERATURE: 97.6 F | HEART RATE: 62 BPM | SYSTOLIC BLOOD PRESSURE: 128 MMHG | OXYGEN SATURATION: 98 % | DIASTOLIC BLOOD PRESSURE: 83 MMHG

## 2021-06-13 DIAGNOSIS — E53.8 B12 DEFICIENCY: Primary | ICD-10-CM

## 2021-06-13 PROCEDURE — 96372 THER/PROPH/DIAG INJ SC/IM: CPT

## 2021-06-13 PROCEDURE — 74011250636 HC RX REV CODE- 250/636: Performed by: INTERNAL MEDICINE

## 2021-06-13 RX ORDER — CYANOCOBALAMIN 1000 UG/ML
1000 INJECTION, SOLUTION INTRAMUSCULAR; SUBCUTANEOUS ONCE
Status: COMPLETED | OUTPATIENT
Start: 2021-06-13 | End: 2021-06-13

## 2021-06-13 RX ADMIN — CYANOCOBALAMIN 1000 MCG: 1000 INJECTION, SOLUTION INTRAMUSCULAR; SUBCUTANEOUS at 07:47

## 2021-06-13 NOTE — PROGRESS NOTES
Arrived to the [facility]. B12 completed. Patient tolerated well. Any issues or concerns during appointment: none. Patient aware of next infusion appointment on (pt has no further appts scheduled. Will notify  to call patient with follow up appts. Pt verbalized he will call Monday as well to make a future appt.)  Discharged amb.

## 2021-08-16 ENCOUNTER — HOSPITAL ENCOUNTER (OUTPATIENT)
Dept: INFUSION THERAPY | Age: 46
Discharge: HOME | End: 2021-08-16
Payer: COMMERCIAL

## 2021-08-16 ENCOUNTER — HOSPITAL ENCOUNTER (OUTPATIENT)
Dept: LAB | Age: 46
Discharge: HOME | End: 2021-08-16
Payer: COMMERCIAL

## 2021-08-16 DIAGNOSIS — D51.0 PERNICIOUS ANEMIA: ICD-10-CM

## 2021-08-16 DIAGNOSIS — E53.8 B12 DEFICIENCY: Primary | ICD-10-CM

## 2021-08-16 LAB
ALBUMIN SERPL-MCNC: 3.6 G/DL (ref 3.5–5)
ALBUMIN/GLOB SERPL: 0.9 {RATIO} (ref 1.2–3.5)
ALP SERPL-CCNC: 102 U/L (ref 50–136)
ALT SERPL-CCNC: 22 U/L (ref 12–65)
ANION GAP SERPL CALC-SCNC: 5 MMOL/L (ref 7–16)
AST SERPL-CCNC: 19 U/L (ref 15–37)
BASOPHILS # BLD: 0 K/UL (ref 0–0.2)
BASOPHILS NFR BLD: 0 % (ref 0–2)
BILIRUB SERPL-MCNC: 1.1 MG/DL (ref 0.2–1.1)
BUN SERPL-MCNC: 9 MG/DL (ref 6–23)
CALCIUM SERPL-MCNC: 8.5 MG/DL (ref 8.3–10.4)
CHLORIDE SERPL-SCNC: 109 MMOL/L (ref 98–107)
CO2 SERPL-SCNC: 26 MMOL/L (ref 21–32)
CREAT SERPL-MCNC: 0.9 MG/DL (ref 0.8–1.5)
DIFFERENTIAL METHOD BLD: ABNORMAL
EOSINOPHIL # BLD: 0.4 K/UL (ref 0–0.8)
EOSINOPHIL NFR BLD: 6 % (ref 0.5–7.8)
ERYTHROCYTE [DISTWIDTH] IN BLOOD BY AUTOMATED COUNT: 12.9 % (ref 11.9–14.6)
GLOBULIN SER CALC-MCNC: 4.2 G/DL (ref 2.3–3.5)
GLUCOSE SERPL-MCNC: 107 MG/DL (ref 65–100)
HCT VFR BLD AUTO: 46 %
HGB BLD-MCNC: 15.6 G/DL (ref 13.6–17.2)
IMM GRANULOCYTES # BLD AUTO: 0 K/UL (ref 0–0.5)
IMM GRANULOCYTES NFR BLD AUTO: 0 % (ref 0–5)
LYMPHOCYTES # BLD: 1.4 K/UL (ref 0.5–4.6)
LYMPHOCYTES NFR BLD: 25 % (ref 13–44)
MCH RBC QN AUTO: 29.9 PG (ref 26.1–32.9)
MCHC RBC AUTO-ENTMCNC: 33.9 G/DL (ref 31.4–35)
MCV RBC AUTO: 88.3 FL (ref 79.6–97.8)
MONOCYTES # BLD: 0.7 K/UL (ref 0.1–1.3)
MONOCYTES NFR BLD: 13 % (ref 4–12)
NEUTS SEG # BLD: 3.1 K/UL (ref 1.7–8.2)
NEUTS SEG NFR BLD: 56 % (ref 43–78)
NRBC # BLD: 0 K/UL (ref 0–0.2)
PLATELET # BLD AUTO: 236 K/UL (ref 150–450)
PMV BLD AUTO: 8.8 FL (ref 9.4–12.3)
POTASSIUM SERPL-SCNC: 3.8 MMOL/L (ref 3.5–5.1)
PROT SERPL-MCNC: 7.8 G/DL (ref 6.3–8.2)
RBC # BLD AUTO: 5.21 M/UL (ref 4.23–5.6)
SODIUM SERPL-SCNC: 140 MMOL/L (ref 136–145)
VIT B12 SERPL-MCNC: 504 PG/ML (ref 193–986)
WBC # BLD AUTO: 5.5 K/UL (ref 4.3–11.1)

## 2021-08-16 PROCEDURE — 80053 COMPREHEN METABOLIC PANEL: CPT

## 2021-08-16 PROCEDURE — 85025 COMPLETE CBC W/AUTO DIFF WBC: CPT

## 2021-08-16 PROCEDURE — 82607 VITAMIN B-12: CPT

## 2021-08-16 PROCEDURE — 74011250636 HC RX REV CODE- 250/636: Performed by: INTERNAL MEDICINE

## 2021-08-16 PROCEDURE — 36415 COLL VENOUS BLD VENIPUNCTURE: CPT

## 2021-08-16 PROCEDURE — 96372 THER/PROPH/DIAG INJ SC/IM: CPT

## 2021-08-16 RX ORDER — CYANOCOBALAMIN 1000 UG/ML
1000 INJECTION, SOLUTION INTRAMUSCULAR; SUBCUTANEOUS ONCE
Status: COMPLETED | OUTPATIENT
Start: 2021-08-16 | End: 2021-08-16

## 2021-08-16 RX ADMIN — CYANOCOBALAMIN 1000 MCG: 1000 INJECTION, SOLUTION INTRAMUSCULAR; SUBCUTANEOUS at 10:20

## 2021-08-16 NOTE — PROGRESS NOTES
Arrived to the [facility]. B12 injection completed in L deltoid. Provided education on IM injections. Patient instructed to report any side affects to ordering provider. Patient tolerated well. Any issues or concerns during appointment: none. Patient aware of next infusion appointment on 9/13 at 8:30am.  Discharged ambulatory to home.

## 2021-12-20 ENCOUNTER — HOSPITAL ENCOUNTER (OUTPATIENT)
Dept: INFUSION THERAPY | Age: 46
Discharge: HOME | End: 2021-12-20
Payer: COMMERCIAL

## 2021-12-20 VITALS
SYSTOLIC BLOOD PRESSURE: 137 MMHG | BODY MASS INDEX: 29.03 KG/M2 | RESPIRATION RATE: 16 BRPM | DIASTOLIC BLOOD PRESSURE: 92 MMHG | WEIGHT: 196.6 LBS | HEART RATE: 67 BPM | TEMPERATURE: 98.2 F | OXYGEN SATURATION: 98 %

## 2021-12-20 DIAGNOSIS — E53.8 B12 DEFICIENCY: Primary | ICD-10-CM

## 2021-12-20 PROCEDURE — 74011250636 HC RX REV CODE- 250/636: Performed by: INTERNAL MEDICINE

## 2021-12-20 PROCEDURE — 96372 THER/PROPH/DIAG INJ SC/IM: CPT

## 2021-12-20 RX ORDER — CYANOCOBALAMIN 1000 UG/ML
1000 INJECTION, SOLUTION INTRAMUSCULAR; SUBCUTANEOUS ONCE
Status: COMPLETED | OUTPATIENT
Start: 2021-12-20 | End: 2021-12-20

## 2021-12-20 RX ADMIN — CYANOCOBALAMIN 1000 MCG: 1000 INJECTION, SOLUTION INTRAMUSCULAR; SUBCUTANEOUS at 09:22

## 2021-12-20 NOTE — PROGRESS NOTES
Patient arrived at [facility] for B12 injection. Assessment completed. No needs voiced at this time. Patient tolerated injection well and is aware of next appointment on 1/17/2022 @3624. Patient discharged ambulatory.

## 2021-12-22 ENCOUNTER — HOSPITAL ENCOUNTER (OUTPATIENT)
Dept: LAB | Age: 46
Discharge: HOME | End: 2021-12-22
Payer: COMMERCIAL

## 2021-12-22 DIAGNOSIS — E53.8 B12 DEFICIENCY: ICD-10-CM

## 2021-12-22 PROBLEM — E03.8 OTHER SPECIFIED HYPOTHYROIDISM: Status: ACTIVE | Noted: 2021-12-22

## 2021-12-22 LAB
ALBUMIN SERPL-MCNC: 3.6 G/DL (ref 3.5–5)
ALBUMIN/GLOB SERPL: 0.9 {RATIO} (ref 1.2–3.5)
ALP SERPL-CCNC: 111 U/L (ref 50–136)
ALT SERPL-CCNC: 17 U/L (ref 12–65)
ANION GAP SERPL CALC-SCNC: 7 MMOL/L (ref 7–16)
AST SERPL-CCNC: 12 U/L (ref 15–37)
BASOPHILS # BLD: 0 K/UL (ref 0–0.2)
BASOPHILS NFR BLD: 0 % (ref 0–2)
BILIRUB SERPL-MCNC: 1.2 MG/DL (ref 0.2–1.1)
BUN SERPL-MCNC: 12 MG/DL (ref 6–23)
CALCIUM SERPL-MCNC: 8.7 MG/DL (ref 8.3–10.4)
CHLORIDE SERPL-SCNC: 107 MMOL/L (ref 98–107)
CO2 SERPL-SCNC: 26 MMOL/L (ref 21–32)
CREAT SERPL-MCNC: 1 MG/DL (ref 0.8–1.5)
DIFFERENTIAL METHOD BLD: ABNORMAL
EOSINOPHIL # BLD: 0.2 K/UL (ref 0–0.8)
EOSINOPHIL NFR BLD: 3 % (ref 0.5–7.8)
ERYTHROCYTE [DISTWIDTH] IN BLOOD BY AUTOMATED COUNT: 12.9 % (ref 11.9–14.6)
GLOBULIN SER CALC-MCNC: 3.9 G/DL (ref 2.3–3.5)
GLUCOSE SERPL-MCNC: 108 MG/DL (ref 65–100)
HCT VFR BLD AUTO: 48.6 %
HGB BLD-MCNC: 16.3 G/DL (ref 13.6–17.2)
IMM GRANULOCYTES # BLD AUTO: 0 K/UL (ref 0–0.5)
IMM GRANULOCYTES NFR BLD AUTO: 0 % (ref 0–5)
LYMPHOCYTES # BLD: 1.3 K/UL (ref 0.5–4.6)
LYMPHOCYTES NFR BLD: 25 % (ref 13–44)
MCH RBC QN AUTO: 30.1 PG (ref 26.1–32.9)
MCHC RBC AUTO-ENTMCNC: 33.5 G/DL (ref 31.4–35)
MCV RBC AUTO: 89.8 FL (ref 79.6–97.8)
MONOCYTES # BLD: 0.7 K/UL (ref 0.1–1.3)
MONOCYTES NFR BLD: 14 % (ref 4–12)
NEUTS SEG # BLD: 2.9 K/UL (ref 1.7–8.2)
NEUTS SEG NFR BLD: 57 % (ref 43–78)
NRBC # BLD: 0 K/UL (ref 0–0.2)
PLATELET # BLD AUTO: 225 K/UL (ref 150–450)
PMV BLD AUTO: 8.7 FL (ref 9.4–12.3)
POTASSIUM SERPL-SCNC: 3.8 MMOL/L (ref 3.5–5.1)
PROT SERPL-MCNC: 7.5 G/DL (ref 6.3–8.2)
RBC # BLD AUTO: 5.41 M/UL (ref 4.23–5.6)
SODIUM SERPL-SCNC: 140 MMOL/L (ref 136–145)
VIT B12 SERPL-MCNC: 2156 PG/ML (ref 193–986)
WBC # BLD AUTO: 5 K/UL (ref 4.3–11.1)

## 2021-12-22 PROCEDURE — 36415 COLL VENOUS BLD VENIPUNCTURE: CPT

## 2021-12-22 PROCEDURE — 80053 COMPREHEN METABOLIC PANEL: CPT

## 2021-12-22 PROCEDURE — 85025 COMPLETE CBC W/AUTO DIFF WBC: CPT

## 2021-12-22 PROCEDURE — 82607 VITAMIN B-12: CPT

## 2022-01-17 ENCOUNTER — APPOINTMENT (OUTPATIENT)
Dept: INFUSION THERAPY | Age: 47
End: 2022-01-17

## 2022-03-18 PROBLEM — R07.9 CHEST PAIN: Status: ACTIVE | Noted: 2020-09-21

## 2022-03-18 PROBLEM — Z86.79 HISTORY OF ATRIAL FIBRILLATION: Status: ACTIVE | Noted: 2020-09-21

## 2022-03-19 PROBLEM — R07.2 PRECORDIAL PAIN: Status: ACTIVE | Noted: 2017-01-11

## 2022-03-19 PROBLEM — D53.9 MACROCYTIC ANEMIA: Status: ACTIVE | Noted: 2020-09-21

## 2022-03-19 PROBLEM — I48.0 PAROXYSMAL ATRIAL FIBRILLATION (HCC): Status: ACTIVE | Noted: 2017-01-11

## 2022-03-19 PROBLEM — I25.110 CORONARY ARTERY DISEASE INVOLVING NATIVE CORONARY ARTERY OF NATIVE HEART WITH UNSTABLE ANGINA PECTORIS (HCC): Status: ACTIVE | Noted: 2017-01-12

## 2022-03-20 PROBLEM — E53.8 B12 DEFICIENCY: Status: ACTIVE | Noted: 2020-10-08

## 2022-03-20 PROBLEM — E03.8 OTHER SPECIFIED HYPOTHYROIDISM: Status: ACTIVE | Noted: 2021-12-22

## 2022-05-06 DIAGNOSIS — E03.9 HYPOTHYROIDISM, UNSPECIFIED TYPE: ICD-10-CM

## 2022-05-06 DIAGNOSIS — E53.8 B12 DEFICIENCY: Primary | ICD-10-CM

## 2022-05-12 RX ORDER — DIPHENHYDRAMINE HYDROCHLORIDE 50 MG/ML
50 INJECTION INTRAMUSCULAR; INTRAVENOUS
OUTPATIENT
Start: 2022-06-22

## 2022-05-12 RX ORDER — SODIUM CHLORIDE 9 MG/ML
INJECTION, SOLUTION INTRAVENOUS CONTINUOUS
OUTPATIENT
Start: 2022-06-22

## 2022-05-12 RX ORDER — ALBUTEROL SULFATE 90 UG/1
4 AEROSOL, METERED RESPIRATORY (INHALATION) PRN
OUTPATIENT
Start: 2022-06-22

## 2022-05-12 RX ORDER — ACETAMINOPHEN 325 MG/1
325 TABLET ORAL
OUTPATIENT
Start: 2022-06-22

## 2022-05-12 RX ORDER — EPINEPHRINE 1 MG/ML
0.3 INJECTION, SOLUTION, CONCENTRATE INTRAVENOUS PRN
OUTPATIENT
Start: 2022-06-22

## 2022-05-12 RX ORDER — ONDANSETRON 2 MG/ML
8 INJECTION INTRAMUSCULAR; INTRAVENOUS
OUTPATIENT
Start: 2022-06-22

## 2022-05-12 RX ORDER — CYANOCOBALAMIN 1000 UG/ML
1000 INJECTION INTRAMUSCULAR; SUBCUTANEOUS ONCE
Start: 2022-06-22

## 2022-05-12 RX ORDER — ACETAMINOPHEN 325 MG/1
650 TABLET ORAL
OUTPATIENT
Start: 2022-06-22

## 2022-08-11 ENCOUNTER — TELEPHONE (OUTPATIENT)
Dept: CARDIOLOGY CLINIC | Age: 47
End: 2022-08-11

## 2022-08-11 NOTE — TELEPHONE ENCOUNTER
Pt states he has noticed that he has been having intermittent afib for the last year. Concerned because the last time this happened, he had a blockage and had to get a stent. Asking to be seen. Offered to work in with another provider as Dr. Karly Ballesteros does not have openings this week or next. Declines. States he is OK to wait. Scheduled to see Dr. Karly Ballesteros on 8/30 at 3 pm. Instructed to call if worsening sx. Verb understanding.

## 2022-08-30 ENCOUNTER — OFFICE VISIT (OUTPATIENT)
Dept: CARDIOLOGY CLINIC | Age: 47
End: 2022-08-30
Payer: COMMERCIAL

## 2022-08-30 VITALS
SYSTOLIC BLOOD PRESSURE: 122 MMHG | HEIGHT: 69 IN | BODY MASS INDEX: 30.56 KG/M2 | WEIGHT: 206.3 LBS | DIASTOLIC BLOOD PRESSURE: 98 MMHG | HEART RATE: 78 BPM

## 2022-08-30 DIAGNOSIS — D51.0 PA (PERNICIOUS ANEMIA): ICD-10-CM

## 2022-08-30 DIAGNOSIS — I25.9 ISCHEMIC HEART DISEASE: ICD-10-CM

## 2022-08-30 DIAGNOSIS — I48.0 PAROXYSMAL ATRIAL FIBRILLATION (HCC): Primary | ICD-10-CM

## 2022-08-30 PROCEDURE — 93000 ELECTROCARDIOGRAM COMPLETE: CPT | Performed by: INTERNAL MEDICINE

## 2022-08-30 PROCEDURE — 99214 OFFICE O/P EST MOD 30 MIN: CPT | Performed by: INTERNAL MEDICINE

## 2022-08-30 RX ORDER — EZETIMIBE AND SIMVASTATIN 10; 20 MG/1; MG/1
1 TABLET ORAL NIGHTLY
Qty: 90 TABLET | Refills: 3 | Status: SHIPPED | OUTPATIENT
Start: 2022-08-30

## 2022-08-30 RX ORDER — METOPROLOL SUCCINATE 25 MG/1
25 TABLET, EXTENDED RELEASE ORAL DAILY
Qty: 90 TABLET | Refills: 3 | Status: SHIPPED | OUTPATIENT
Start: 2022-08-30

## 2022-08-30 NOTE — PROGRESS NOTES
[facility] CARDIOLOGY  [address], [address], [address]  Joanna Frames, [address] [facility]  PHONE: [phone]        22        NAME:  Gemini Luke  : 1975  MRN: [id number]     CHIEF COMPLAINT:    Atrial Fibrillation         SUBJECTIVE:     ~1x/month pt has a fib which may last 12 24 hr. Has not been taking any BB rx. He is now receptive for an a fib ablation. Medications were all reviewed with the patient today and updated as necessary. Current Outpatient Medications   Medication Sig    metoprolol succinate (TOPROL XL) 25 MG extended release tablet Take 1 tablet by mouth daily    ezetimibe-simvastatin (VYTORIN) 10-20 MG per tablet Take 1 tablet by mouth nightly    aspirin 81 MG chewable tablet Take 81 mg by mouth daily    nitroGLYCERIN (NITROSTAT) 0.4 MG SL tablet Place 0.4 mg under the tongue    levothyroxine (SYNTHROID) 25 MCG tablet Take 25 mcg by mouth every morning (before breakfast) (Patient not taking: Reported on 2022)     No current facility-administered medications for this visit. No Known Allergies        PHYSICAL EXAM:     Wt Readings from Last 3 Encounters:   22 206 lb 4.8 oz (93.6 kg)   21 197 lb (89.4 kg)   21 204 lb (92.5 kg)     BP Readings from Last 3 Encounters:   22 (!) 122/98   21 134/67   21 (!) 128/96       BP (!) 122/98   Pulse 78   Ht 5' 9\" (1.753 m)   Wt 206 lb 4.8 oz (93.6 kg)   BMI 30.47 kg/m²     Physical Exam  Vitals reviewed. HENT:      Head: Normocephalic and atraumatic. Eyes:      Extraocular Movements: Extraocular movements intact. Pupils: Pupils are equal, round, and reactive to light. Cardiovascular:      Rate and Rhythm: Normal rate. Heart sounds: Normal heart sounds. Pulmonary:      Effort: Pulmonary effort is normal.      Breath sounds: Normal breath sounds. Abdominal:      General: Abdomen is flat. Palpations: Abdomen is soft. There is no mass.    Musculoskeletal: General: Normal range of motion. Cervical back: Normal range of motion. Skin:     General: Skin is warm and dry. Neurological:      General: No focal deficit present. Mental Status: He is alert and oriented to person, place, and time. Psychiatric:         Mood and Affect: Mood normal.         RECENT LABS AND RECORDS REVIEW          ASSESSMENT and PLAN    Mega Pink was seen today for atrial fibrillation. Diagnoses and all orders for this visit:    Paroxysmal atrial fibrillation ([facility] [address]  -     EKG 12 lead  -     [facility] - Beto Shaw MD, Cardiac Electrophysiology, Nayeli KIM (pernicious anemia)    Ischemic heart disease    Other orders  -     metoprolol succinate (TOPROL XL) 25 MG extended release tablet; Take 1 tablet by mouth daily  -     ezetimibe-simvastatin (VYTORIN) 10-20 MG per tablet; Take 1 tablet by mouth nightly     Return in about 6 months (around 2/28/2023).        Tano Ferguson MD  8/30/2022  3:57 PM

## 2022-12-08 ENCOUNTER — INITIAL CONSULT (OUTPATIENT)
Dept: CARDIOLOGY CLINIC | Age: 47
End: 2022-12-08
Payer: COMMERCIAL

## 2022-12-08 VITALS
WEIGHT: 206.8 LBS | HEART RATE: 59 BPM | SYSTOLIC BLOOD PRESSURE: 128 MMHG | DIASTOLIC BLOOD PRESSURE: 86 MMHG | BODY MASS INDEX: 30.63 KG/M2 | HEIGHT: 69 IN

## 2022-12-08 DIAGNOSIS — I48.0 PAROXYSMAL ATRIAL FIBRILLATION (HCC): Primary | ICD-10-CM

## 2022-12-08 DIAGNOSIS — I25.110 CORONARY ARTERY DISEASE INVOLVING NATIVE CORONARY ARTERY OF NATIVE HEART WITH UNSTABLE ANGINA PECTORIS (HCC): ICD-10-CM

## 2022-12-08 PROCEDURE — 99245 OFF/OP CONSLTJ NEW/EST HI 55: CPT | Performed by: INTERNAL MEDICINE

## 2022-12-08 PROCEDURE — 93000 ELECTROCARDIOGRAM COMPLETE: CPT | Performed by: INTERNAL MEDICINE

## 2022-12-08 ASSESSMENT — ENCOUNTER SYMPTOMS
EYES NEGATIVE: 1
ALLERGIC/IMMUNOLOGIC NEGATIVE: 1
GASTROINTESTINAL NEGATIVE: 1
SHORTNESS OF BREATH: 1

## 2022-12-08 NOTE — PROGRESS NOTES
[facility] CARDIOLOGY  [address] [place] [address], [address] [facility], [address] [place] [address]  PHONE: [phone]        22      NAME:  Queenie Gtz  : 1975  MRN: [id number]     Referring Cardiologist: Madelaine Noguera MD    Reason for Consultation: Atrial fibrillation    ASSESSMENT and PLAN:  Karen Ny was seen today for consultation and atrial fibrillation. Diagnoses and all orders for this visit:    Paroxysmal atrial fibrillation ([facility] [address]    Coronary artery disease involving native coronary artery of native heart with unstable angina pectoris ([facility] [address]    49year old male with a history of pAF here for evaluation. We reviewed treatment options and he is interested in the AF ablation. -AF -plan for AF ablation. Start Eliquis. -CAD - OMT per Dr. Tessa Ervin. Procedure to be performed: AF ablation  Device/ablation Company: [employer]  Procedure Date: TBD  Medications to hold, days to hold ([facility], AAD): Eliquis, 1 dose. Anesthesia: GA   PING required?: Y    AF ABLATION EDUCATION (done today):  I discussed with the patient the pathophysiology of atrial fibrillation, including details about potential triggers from the pulmonary veins (PVs), as well as non-PV sites. We also discussed that in certain patients (especially those with more persistent AF) there is often atrial substrate (i.e. fibrosis, dilatation, etc.) that promotes more sustained AF. We also discussed the therapeutic options for treatment of atrial fibrillation, including:  --Rate control   --Rhythm control with antiarrhythmic drugs (AAD) and supplemental rate control  --Catheter ablation, including pulmonary vein isolation (PVI), with or without additional substrate modification, in attempt to maintain sinus rhythm long-term  --AV danni ablation with a permanent pacemaker (ablate & pace).     I emphasized to the patient that all of these options require stroke prophylaxis with oral anticoagulation therapy ([address]) with  Coumadin or novel oral anticoagulant (NOACs), depending on risk factors. I explained that successful catheter ablation with sufficient long-term follow-up documenting a lack of recurrent AF may allow for discontinuation of anticoagulation in the future; however, this has not been proven safe in prospective clinical studies and is still considered experimental.  Data from retrospective trials, however, has suggested that stopping oral anticoagulation after successful ablation does not result in increased in stroke rates and appears to be safe. The benefits and risks of each of these approaches were outlined in detail. We discussed the variable success rates of AF ablation, which can range from 50-80+%, depending on multiple factors, including paroxysmal vs. persistent AF, duration of AF, AF burden, left atrial size and remodeling, concomitant valvular or other structural heart disease, non-PV triggers, prior ablation lesion sets, obstructive sleep apnea, and other potential confounding factors. I also explained that often (approximately 30-50% of the time) patients will require multiple procedures to achieve long-term AF suppression. Additionally, we discussed that ablation may decrease AF burden and/or symptoms, but additional therapeutic strategies (i.e. concomitant AAD therapy, rate controlling medications, oral anticoagulants, etc.) may be needed long term for AF management. The catheter ablation procedure was described to the patient in detail, including the risks of recurrent AF/AT, need for repeat ablation, esophageal perforation/fistula, pulmonary vein stenosis,  bronchial injury/fistula, stroke, cardiac perforation with the need for catheter drainage or surgical repair, vascular damage, DVT/PE, bleeding, thermal skin burns, radiation skin injury, kidney failure, pneumo/hemothorax, need for permanent pacemaker, phrenic or vagus nerve damage resulting in diaphragmatic paralysis or gastroparesis, stiff left atrial syndrome, and even death.       We also discussed in detail today the options for anticoagulation for AF , including the periprocedure period. Published data (the MORENA trial, [place] 2011) suggests that Price Hench is non-inferior to warfarin for stroke prevention in AF, and appears to be safer with reductions in bleeding and overall mortality. Also, using Apixaban will allow us to avoid routine lab monitoring as well as many drug-drug and food-drug interactions. Additionally, using Apixaban will allow us to avoid using LMWH periprocedurally, which may reduce bleeding and hematomas. The patient is aware of these risks/benefits and wishes to proceed with using apixiban periprocedurally. TOTAL TIME: 45 minutes, >50% during counseling and coordination of care    Patient has been instructed and agrees to call our office with any issues or other concerns related to their cardiac condition(s) and/or complaint(s). No follow-up provider specified. Thank you for allowing me to participate in the electrophysiologic care of Mr. Leslye Barnes. Please contact me if any questions or concerns were to arise. Daphne Lees MD, MS  Clinical Cardiac Electrophysiology  [facility] Cardiology  12/08/22  9:15 AM    ===================================================================  Chief Complant:    Chief Complaint   Patient presents with    Consultation    Atrial Fibrillation        Consultation is requested by Carri Farley MD for evaluation of Consultation and Atrial Fibrillation    History:  Leslye Barnes is a most pleasant 49 y.o. male with a past medical and cardiac history significant for a history of AF. He is referred by Dr. Abbie Morales. He comes in to discuss his AF. He was first diagnosed in 2017 after a PCI to his LAD. He's been having more episodes lately and is highly symptomatic. He has failed medical therapy. He is not currently on [employer].  The patient otherwise denies chest pain, dyspnea, presyncope, syncope or lateralizing symptoms. He is getting  soon. He is a manager of a maintenance team.     Cardiac PMH: (Old records have been reviewed and summarized below)    EKG:  (EKG has been independently visualized by me with interpretation below): NSR, normal axis, no ischemia. ECHO: 2020  -  Left ventricle: Systolic function was normal. Ejection fraction was  estimated in the range of 55 % to 60 %. There were no regional wall motion  abnormalities. Left ventricular diastolic function parameters were normal.   E/e' av.66.  -  Right ventricle: There was no pulmonary artery hypertension.  -  Tricuspid valve: There was trivial regurgitation. Previous Heart Catheterization: 2017  HEMODYNAMIC ASSESSMENT   1. Aortic pressure was 102/61 with a mean of 76.   2. Left ventricular end-diastolic pressure of 13.   3. No significant gradient across the aortic valve. ANGIOGRAPHIC RESULTS    1. Left main coronary artery: Large-caliber vessel. Angiographically normal.    2. LAD: Significantly diseased in the proximal segment. Up to 70%   stenosis is noted. Distal vessel is patent. 3. First diagonal artery: Small-caliber vessel. Contains a 40% mid   stenosis. 4. Second diagonal artery: Small- to medium-caliber vessel. Patent. 5. Left circumflex: Medium-caliber, dominant vessel. Contains a   30% mid stenosis. 6. First obtuse marginal: Is a medium-caliber vessel. Contains 40%   proximal stenosis. 7. Second obtuse marginal artery: Very small-caliber, 1.5 mm   vessel. 80% proximal stenosis. 8. Left posterolateral branch 1 is a medium-caliber vessel. 20%   proximal stenosis. 9. Left PDA: A medium-caliber vessel. Diffuse 30% proximal   stenosis. 10. Right coronary artery: Medium-caliber, nondominant vessel. 30%   proximal stenosis. 11. Left ventriculogram performed in FITCH projection shows normal   left ventricular systolic function, EF 84% to 65%. Aortic root   is nondilated. No mitral regurgitation. Stress Test: n/a     DEVICE INTERROGATION: n/a     Past Medical History, Past Surgical History, Family history, Social History, and Medications were all reviewed with the patient today and updated as necessary. Current Outpatient Medications   Medication Sig Dispense Refill    apixaban (ELIQUIS) 5 MG TABS tablet Take 1 tablet by mouth 2 times daily 60 tablet 5    ezetimibe-simvastatin (VYTORIN) 10-20 MG per tablet Take 1 tablet by mouth nightly 90 tablet 3    levothyroxine (SYNTHROID) 25 MCG tablet Take 25 mcg by mouth every morning (before breakfast)      nitroGLYCERIN (NITROSTAT) 0.4 MG SL tablet Place 0.4 mg under the tongue      metoprolol succinate (TOPROL XL) 25 MG extended release tablet Take 1 tablet by mouth daily (Patient not taking: Reported on 12/8/2022) 90 tablet 3     No current facility-administered medications for this visit. No Known Allergies    Past Medical History:   Diagnosis Date    B12 deficiency 10/8/2020    Coronary artery disease involving native coronary artery of native heart with unstable angina pectoris ([facility] [address] 1/12/2017    Other ill-defined conditions(799.89)     kidney stones    Other specified hypothyroidism 12/22/2021     History reviewed. No pertinent surgical history. Family History   Problem Relation Age of Onset    No Known Problems Mother     Heart Disease Father     Heart Disease Brother     Heart Disease Paternal Grandfather      Social History     Tobacco Use    Smoking status: Never    Smokeless tobacco: Never   Substance Use Topics    Alcohol use: No       ROS:  A comprehensive review of systems was performed with the pertinent positives and negatives as noted in the HPI in addition to:  Review of Systems   Constitutional: Negative. HENT: Negative. Eyes: Negative. Respiratory:  Positive for shortness of breath. Cardiovascular:  Positive for palpitations. Gastrointestinal: Negative. Endocrine: Negative. Genitourinary: Negative. Musculoskeletal: Negative. Skin: Negative. Allergic/Immunologic: Negative. Neurological: Negative. Hematological: Negative. Psychiatric/Behavioral: Negative. All other systems reviewed and are negative. PHYSICAL EXAM:   /86   Pulse 59   Ht 5' 9\" (1.753 m)   Wt 206 lb 12.8 oz (93.8 kg)   BMI 30.54 kg/m²      Wt Readings from Last 3 Encounters:   12/08/22 206 lb 12.8 oz (93.8 kg)   08/30/22 206 lb 4.8 oz (93.6 kg)   12/22/21 197 lb (89.4 kg)     BP Readings from Last 3 Encounters:   12/08/22 128/86   08/30/22 (!) 122/98   12/22/21 134/67       Gen: Well appearing, well developed, no acute distress  Eyes: Pupils equal, round. Extraocular movements are intact  ENT: Oropharynx clear, no oral lesions, normal dentition  CV: S1S2, regular rate and rhythm, no murmurs, rubs or gallops, normal JVD, no carotid bruits, normal distal pulses, no MODESTA  Pulm: Clear to auscultation bilaterally, no accessory muscle uses, no wheezes or rales  GI: Soft, NT, ND, +BS  Neuro: Alert and oriented, nonfocal  Psych: Appropriate affect  Skin: Normal color and skin turgor  MSK: Normal muscle bulk and tone    Medical problems and test results were reviewed with the patient today. No results found for any visits on 12/08/22.

## 2022-12-12 DIAGNOSIS — I48.0 PAROXYSMAL ATRIAL FIBRILLATION (HCC): ICD-10-CM

## 2022-12-12 LAB
ERYTHROCYTE [DISTWIDTH] IN BLOOD BY AUTOMATED COUNT: 13.5 % (ref 11.9–14.6)
HCT VFR BLD AUTO: 49.9 % (ref 41.1–50.3)
HGB BLD-MCNC: 16.1 G/DL (ref 13.6–17.2)
INR PPP: 1
MCH RBC QN AUTO: 29 PG (ref 26.1–32.9)
MCHC RBC AUTO-ENTMCNC: 32.3 G/DL (ref 31.4–35)
MCV RBC AUTO: 89.7 FL (ref 82–102)
NRBC # BLD: 0 K/UL (ref 0–0.2)
PLATELET # BLD AUTO: 269 K/UL (ref 150–450)
PMV BLD AUTO: 9 FL (ref 9.4–12.3)
PROTHROMBIN TIME: 13.7 SEC (ref 12.6–14.3)
RBC # BLD AUTO: 5.56 M/UL (ref 4.23–5.6)
WBC # BLD AUTO: 3.9 K/UL (ref 4.3–11.1)

## 2022-12-13 ENCOUNTER — ANESTHESIA EVENT (OUTPATIENT)
Dept: CARDIAC CATH/INVASIVE PROCEDURES | Age: 47
End: 2022-12-13
Payer: COMMERCIAL

## 2022-12-13 LAB
ANION GAP SERPL CALC-SCNC: 7 MMOL/L (ref 2–11)
BUN SERPL-MCNC: 13 MG/DL (ref 6–23)
CALCIUM SERPL-MCNC: 8.9 MG/DL (ref 8.3–10.4)
CHLORIDE SERPL-SCNC: 108 MMOL/L (ref 101–110)
CO2 SERPL-SCNC: 26 MMOL/L (ref 21–32)
CREAT SERPL-MCNC: 0.9 MG/DL (ref 0.8–1.5)
GLUCOSE SERPL-MCNC: 85 MG/DL (ref 65–100)
POTASSIUM SERPL-SCNC: 4.4 MMOL/L (ref 3.5–5.1)
SODIUM SERPL-SCNC: 141 MMOL/L (ref 133–143)

## 2022-12-13 RX ORDER — SODIUM CHLORIDE 0.9 % (FLUSH) 0.9 %
5-40 SYRINGE (ML) INJECTION EVERY 12 HOURS SCHEDULED
Status: CANCELLED | OUTPATIENT
Start: 2022-12-13

## 2022-12-13 RX ORDER — FENTANYL CITRATE 50 UG/ML
100 INJECTION, SOLUTION INTRAMUSCULAR; INTRAVENOUS
Status: CANCELLED | OUTPATIENT
Start: 2022-12-13 | End: 2022-12-14

## 2022-12-13 RX ORDER — SODIUM CHLORIDE 0.9 % (FLUSH) 0.9 %
5-40 SYRINGE (ML) INJECTION PRN
Status: CANCELLED | OUTPATIENT
Start: 2022-12-13

## 2022-12-13 RX ORDER — FENTANYL CITRATE 50 UG/ML
25 INJECTION, SOLUTION INTRAMUSCULAR; INTRAVENOUS
Status: CANCELLED | OUTPATIENT
Start: 2022-12-13 | End: 2022-12-14

## 2022-12-13 RX ORDER — MIDAZOLAM HYDROCHLORIDE 2 MG/2ML
2 INJECTION, SOLUTION INTRAMUSCULAR; INTRAVENOUS
Status: CANCELLED | OUTPATIENT
Start: 2022-12-13 | End: 2022-12-14

## 2022-12-13 RX ORDER — SODIUM CHLORIDE, SODIUM LACTATE, POTASSIUM CHLORIDE, CALCIUM CHLORIDE 600; 310; 30; 20 MG/100ML; MG/100ML; MG/100ML; MG/100ML
INJECTION, SOLUTION INTRAVENOUS CONTINUOUS
Status: CANCELLED | OUTPATIENT
Start: 2022-12-13

## 2022-12-13 NOTE — PROGRESS NOTES
Patient pre-assessment complete (with daughter, Leigh Ramirez) for afib ablation with Dr. Darnell Rayo scheduled for 22 at 10:30am, arrival time 8:30am. Patient verified using . Patient instructed to bring all home medications in labeled bottles on the day of procedure. NPO status reinforced. Patient instructed to HOLD Eliquis starting today. Instructed they can take synthroid the morning of the procedure, and hold all other medications. Patient verbalizes understanding of all instructions & denies any questions at this time.

## 2022-12-14 ENCOUNTER — HOSPITAL ENCOUNTER (OUTPATIENT)
Age: 47
Setting detail: OUTPATIENT SURGERY
Discharge: HOME | End: 2022-12-14
Attending: INTERNAL MEDICINE | Admitting: INTERNAL MEDICINE
Payer: COMMERCIAL

## 2022-12-14 ENCOUNTER — APPOINTMENT (OUTPATIENT)
Dept: CARDIAC CATH/INVASIVE PROCEDURES | Age: 47
End: 2022-12-14
Attending: INTERNAL MEDICINE
Payer: COMMERCIAL

## 2022-12-14 ENCOUNTER — ANESTHESIA (OUTPATIENT)
Dept: CARDIAC CATH/INVASIVE PROCEDURES | Age: 47
End: 2022-12-14
Payer: COMMERCIAL

## 2022-12-14 VITALS
SYSTOLIC BLOOD PRESSURE: 134 MMHG | BODY MASS INDEX: 30.37 KG/M2 | RESPIRATION RATE: 16 BRPM | DIASTOLIC BLOOD PRESSURE: 95 MMHG | HEIGHT: 69 IN | OXYGEN SATURATION: 98 % | TEMPERATURE: 97.8 F | WEIGHT: 205.03 LBS | HEART RATE: 84 BPM

## 2022-12-14 DIAGNOSIS — I48.91 ATRIAL FIBRILLATION (HCC): ICD-10-CM

## 2022-12-14 DIAGNOSIS — I48.91 A-FIB (HCC): ICD-10-CM

## 2022-12-14 LAB
ACT BLD: 377 SECS (ref 70–128)
ECHO BSA: 2.13 M2
ECHO BSA: 2.13 M2
EKG ATRIAL RATE: 56 BPM
EKG DIAGNOSIS: NORMAL
EKG P AXIS: 57 DEGREES
EKG P-R INTERVAL: 138 MS
EKG Q-T INTERVAL: 442 MS
EKG QRS DURATION: 92 MS
EKG QTC CALCULATION (BAZETT): 426 MS
EKG R AXIS: -8 DEGREES
EKG T AXIS: 44 DEGREES
EKG VENTRICULAR RATE: 56 BPM

## 2022-12-14 PROCEDURE — 93656 COMPRE EP EVAL ABLTJ ATR FIB: CPT | Performed by: INTERNAL MEDICINE

## 2022-12-14 PROCEDURE — C1766 INTRO/SHEATH,STRBLE,NON-PEEL: HCPCS | Performed by: INTERNAL MEDICINE

## 2022-12-14 PROCEDURE — 7100000000 HC PACU RECOVERY - FIRST 15 MIN: Performed by: INTERNAL MEDICINE

## 2022-12-14 PROCEDURE — 7100000001 HC PACU RECOVERY - ADDTL 15 MIN: Performed by: INTERNAL MEDICINE

## 2022-12-14 PROCEDURE — 2709999900 HC NON-CHARGEABLE SUPPLY: Performed by: INTERNAL MEDICINE

## 2022-12-14 PROCEDURE — C1732 CATH, EP, DIAG/ABL, 3D/VECT: HCPCS | Performed by: INTERNAL MEDICINE

## 2022-12-14 PROCEDURE — 3700000000 HC ANESTHESIA ATTENDED CARE: Performed by: INTERNAL MEDICINE

## 2022-12-14 PROCEDURE — 85347 COAGULATION TIME ACTIVATED: CPT

## 2022-12-14 PROCEDURE — 93622 COMP EP EVAL L VENTR PAC&REC: CPT | Performed by: INTERNAL MEDICINE

## 2022-12-14 PROCEDURE — 2720000010 HC SURG SUPPLY STERILE: Performed by: INTERNAL MEDICINE

## 2022-12-14 PROCEDURE — 93312 ECHO TRANSESOPHAGEAL: CPT

## 2022-12-14 PROCEDURE — C1894 INTRO/SHEATH, NON-LASER: HCPCS | Performed by: INTERNAL MEDICINE

## 2022-12-14 PROCEDURE — C1893 INTRO/SHEATH, FIXED,NON-PEEL: HCPCS | Performed by: INTERNAL MEDICINE

## 2022-12-14 PROCEDURE — C1759 CATH, INTRA ECHOCARDIOGRAPHY: HCPCS | Performed by: INTERNAL MEDICINE

## 2022-12-14 PROCEDURE — 3700000001 HC ADD 15 MINUTES (ANESTHESIA): Performed by: INTERNAL MEDICINE

## 2022-12-14 PROCEDURE — 93005 ELECTROCARDIOGRAM TRACING: CPT | Performed by: INTERNAL MEDICINE

## 2022-12-14 PROCEDURE — C1760 CLOSURE DEV, VASC: HCPCS | Performed by: INTERNAL MEDICINE

## 2022-12-14 PROCEDURE — C1730 CATH, EP, 19 OR FEW ELECT: HCPCS | Performed by: INTERNAL MEDICINE

## 2022-12-14 PROCEDURE — 2580000003 HC RX 258: Performed by: NURSE ANESTHETIST, CERTIFIED REGISTERED

## 2022-12-14 PROCEDURE — 6360000002 HC RX W HCPCS: Performed by: NURSE ANESTHETIST, CERTIFIED REGISTERED

## 2022-12-14 PROCEDURE — 93655 ICAR CATH ABLTJ DSCRT ARRHYT: CPT | Performed by: INTERNAL MEDICINE

## 2022-12-14 PROCEDURE — 6360000002 HC RX W HCPCS: Performed by: INTERNAL MEDICINE

## 2022-12-14 PROCEDURE — 2500000003 HC RX 250 WO HCPCS: Performed by: NURSE ANESTHETIST, CERTIFIED REGISTERED

## 2022-12-14 RX ORDER — ADENOSINE 3 MG/ML
INJECTION, SOLUTION INTRAVENOUS PRN
Status: DISCONTINUED | OUTPATIENT
Start: 2022-12-14 | End: 2022-12-14 | Stop reason: HOSPADM

## 2022-12-14 RX ORDER — SUCRALFATE 1 G/1
1 TABLET ORAL 4 TIMES DAILY
Qty: 120 TABLET | Refills: 0 | Status: SHIPPED | OUTPATIENT
Start: 2022-12-14

## 2022-12-14 RX ORDER — OXYCODONE HYDROCHLORIDE 5 MG/1
10 TABLET ORAL PRN
Status: DISCONTINUED | OUTPATIENT
Start: 2022-12-14 | End: 2022-12-14 | Stop reason: HOSPADM

## 2022-12-14 RX ORDER — PANTOPRAZOLE SODIUM 40 MG/1
40 TABLET, DELAYED RELEASE ORAL 2 TIMES DAILY
Qty: 60 TABLET | Refills: 0 | Status: SHIPPED | OUTPATIENT
Start: 2022-12-14 | End: 2022-12-14 | Stop reason: SDUPTHER

## 2022-12-14 RX ORDER — SODIUM CHLORIDE, SODIUM LACTATE, POTASSIUM CHLORIDE, CALCIUM CHLORIDE 600; 310; 30; 20 MG/100ML; MG/100ML; MG/100ML; MG/100ML
INJECTION, SOLUTION INTRAVENOUS CONTINUOUS PRN
Status: DISCONTINUED | OUTPATIENT
Start: 2022-12-14 | End: 2022-12-14 | Stop reason: SDUPTHER

## 2022-12-14 RX ORDER — PROTAMINE SULFATE 10 MG/ML
INJECTION, SOLUTION INTRAVENOUS PRN
Status: DISCONTINUED | OUTPATIENT
Start: 2022-12-14 | End: 2022-12-14 | Stop reason: SDUPTHER

## 2022-12-14 RX ORDER — COLCHICINE 0.6 MG/1
0.6 TABLET ORAL 2 TIMES DAILY PRN
Qty: 20 TABLET | Refills: 0 | Status: SHIPPED | OUTPATIENT
Start: 2022-12-14 | End: 2022-12-14 | Stop reason: SDUPTHER

## 2022-12-14 RX ORDER — LIDOCAINE HYDROCHLORIDE 20 MG/ML
INJECTION, SOLUTION EPIDURAL; INFILTRATION; INTRACAUDAL; PERINEURAL PRN
Status: DISCONTINUED | OUTPATIENT
Start: 2022-12-14 | End: 2022-12-14 | Stop reason: SDUPTHER

## 2022-12-14 RX ORDER — ROCURONIUM BROMIDE 10 MG/ML
INJECTION, SOLUTION INTRAVENOUS PRN
Status: DISCONTINUED | OUTPATIENT
Start: 2022-12-14 | End: 2022-12-14 | Stop reason: SDUPTHER

## 2022-12-14 RX ORDER — HEPARIN SODIUM 200 [USP'U]/100ML
INJECTION, SOLUTION INTRAVENOUS CONTINUOUS PRN
Status: DISCONTINUED | OUTPATIENT
Start: 2022-12-14 | End: 2022-12-14 | Stop reason: SDUPTHER

## 2022-12-14 RX ORDER — ONDANSETRON 2 MG/ML
4 INJECTION INTRAMUSCULAR; INTRAVENOUS
Status: DISCONTINUED | OUTPATIENT
Start: 2022-12-14 | End: 2022-12-14 | Stop reason: HOSPADM

## 2022-12-14 RX ORDER — PROPOFOL 10 MG/ML
INJECTION, EMULSION INTRAVENOUS PRN
Status: DISCONTINUED | OUTPATIENT
Start: 2022-12-14 | End: 2022-12-14 | Stop reason: SDUPTHER

## 2022-12-14 RX ORDER — DEXAMETHASONE SODIUM PHOSPHATE 4 MG/ML
INJECTION, SOLUTION INTRA-ARTICULAR; INTRALESIONAL; INTRAMUSCULAR; INTRAVENOUS; SOFT TISSUE PRN
Status: DISCONTINUED | OUTPATIENT
Start: 2022-12-14 | End: 2022-12-14 | Stop reason: SDUPTHER

## 2022-12-14 RX ORDER — HEPARIN SODIUM 1000 [USP'U]/ML
INJECTION, SOLUTION INTRAVENOUS; SUBCUTANEOUS PRN
Status: DISCONTINUED | OUTPATIENT
Start: 2022-12-14 | End: 2022-12-14 | Stop reason: SDUPTHER

## 2022-12-14 RX ORDER — OXYCODONE HYDROCHLORIDE 5 MG/1
5 TABLET ORAL PRN
Status: DISCONTINUED | OUTPATIENT
Start: 2022-12-14 | End: 2022-12-14 | Stop reason: HOSPADM

## 2022-12-14 RX ORDER — DIPHENHYDRAMINE HYDROCHLORIDE 50 MG/ML
12.5 INJECTION INTRAMUSCULAR; INTRAVENOUS
Status: DISCONTINUED | OUTPATIENT
Start: 2022-12-14 | End: 2022-12-14 | Stop reason: HOSPADM

## 2022-12-14 RX ORDER — SUCRALFATE 1 G/1
1 TABLET ORAL 4 TIMES DAILY
Qty: 120 TABLET | Refills: 0 | Status: SHIPPED | OUTPATIENT
Start: 2022-12-14 | End: 2022-12-14 | Stop reason: SDUPTHER

## 2022-12-14 RX ORDER — PANTOPRAZOLE SODIUM 40 MG/1
40 TABLET, DELAYED RELEASE ORAL
Qty: 60 TABLET | Refills: 0 | Status: SHIPPED | OUTPATIENT
Start: 2022-12-14

## 2022-12-14 RX ORDER — COLCHICINE 0.6 MG/1
0.6 TABLET ORAL 2 TIMES DAILY PRN
Qty: 30 TABLET | Refills: 0 | Status: SHIPPED | OUTPATIENT
Start: 2022-12-14

## 2022-12-14 RX ORDER — ONDANSETRON 2 MG/ML
INJECTION INTRAMUSCULAR; INTRAVENOUS PRN
Status: DISCONTINUED | OUTPATIENT
Start: 2022-12-14 | End: 2022-12-14 | Stop reason: SDUPTHER

## 2022-12-14 RX ORDER — HYDROMORPHONE HCL 110MG/55ML
0.5 PATIENT CONTROLLED ANALGESIA SYRINGE INTRAVENOUS EVERY 5 MIN PRN
Status: DISCONTINUED | OUTPATIENT
Start: 2022-12-14 | End: 2022-12-14 | Stop reason: HOSPADM

## 2022-12-14 RX ADMIN — ROCURONIUM BROMIDE 10 MG: 50 INJECTION, SOLUTION INTRAVENOUS at 12:08

## 2022-12-14 RX ADMIN — PHENYLEPHRINE HYDROCHLORIDE 1 ML: 10 INJECTION INTRAVENOUS at 12:24

## 2022-12-14 RX ADMIN — PROTAMINE SULFATE 10 MG: 10 INJECTION, SOLUTION INTRAVENOUS at 13:10

## 2022-12-14 RX ADMIN — ROCURONIUM BROMIDE 50 MG: 50 INJECTION, SOLUTION INTRAVENOUS at 11:29

## 2022-12-14 RX ADMIN — SODIUM CHLORIDE, SODIUM LACTATE, POTASSIUM CHLORIDE, AND CALCIUM CHLORIDE: 600; 310; 30; 20 INJECTION, SOLUTION INTRAVENOUS at 11:22

## 2022-12-14 RX ADMIN — HEPARIN SODIUM 8000 UNITS: 1000 INJECTION, SOLUTION INTRAVENOUS; SUBCUTANEOUS at 12:11

## 2022-12-14 RX ADMIN — SUGAMMADEX 200 MG: 100 INJECTION, SOLUTION INTRAVENOUS at 13:12

## 2022-12-14 RX ADMIN — PROTAMINE SULFATE 20 MG: 10 INJECTION, SOLUTION INTRAVENOUS at 13:14

## 2022-12-14 RX ADMIN — ONDANSETRON 4 MG: 2 INJECTION INTRAMUSCULAR; INTRAVENOUS at 13:10

## 2022-12-14 RX ADMIN — DEXAMETHASONE SODIUM PHOSPHATE 4 MG: 4 INJECTION, SOLUTION INTRAMUSCULAR; INTRAVENOUS at 13:11

## 2022-12-14 RX ADMIN — PROTAMINE SULFATE 10 MG: 10 INJECTION, SOLUTION INTRAVENOUS at 13:15

## 2022-12-14 RX ADMIN — PROTAMINE SULFATE 20 MG: 10 INJECTION, SOLUTION INTRAVENOUS at 13:11

## 2022-12-14 RX ADMIN — PROPOFOL 200 MG: 10 INJECTION, EMULSION INTRAVENOUS at 11:29

## 2022-12-14 RX ADMIN — ROCURONIUM BROMIDE 30 MG: 50 INJECTION, SOLUTION INTRAVENOUS at 12:19

## 2022-12-14 RX ADMIN — HEPARIN SODIUM 8000 UNITS: 1000 INJECTION, SOLUTION INTRAVENOUS; SUBCUTANEOUS at 11:51

## 2022-12-14 RX ADMIN — LIDOCAINE HYDROCHLORIDE 80 MG: 20 INJECTION, SOLUTION EPIDURAL; INFILTRATION; INTRACAUDAL; PERINEURAL at 11:29

## 2022-12-14 RX ADMIN — PROTAMINE SULFATE 20 MG: 10 INJECTION, SOLUTION INTRAVENOUS at 13:12

## 2022-12-14 RX ADMIN — HEPARIN SODIUM 40 UNITS/KG/HR: 200 INJECTION, SOLUTION INTRAVENOUS at 12:11

## 2022-12-14 RX ADMIN — PROTAMINE SULFATE 20 MG: 10 INJECTION, SOLUTION INTRAVENOUS at 13:13

## 2022-12-14 NOTE — DISCHARGE INSTRUCTIONS
Atrial Fib Ablation Discharge Instructions    1 - Check the puncture site frequently for swelling or bleeding. If you see any bleeding, lie down and apply pressure over the area with a clean town or washcloth. Notify your doctor for any redness, swelling, drainage or oozing from the puncture site. Notify your doctor for any fever or chills. 2 - If the leg with the puncture becomes cold, numb or painful, call [facility] at  [phone].    3 - Activity should be limited for the next 48 hours. Climb stairs as little as possible and avoid any stooping, bending or strenuous activity for 48 hours. No heavy lifting (anything over 10 pounds) for three days. 4 - Do not drive for the first 24 hours post ablation. 5 - You may resume your usual diet. Drink more fluids than usual.    6 - Have a responsible person drive you home and stay with you for at least 24 hours after your ablation. 7 -You may remove the bandage from your Right & Left Groin in 24 hours. You may shower in 24 hours. No tub baths, hot tubs or swimming for one week. Do not place any lotions, creams, powders, ointments over the puncture site for one week. You may place a clean band-aid over the puncture site each day for 5 days. Change this daily. 8 - Continue medicines for now including your blood thinner Eliquis, and your rhythm medicine Metoprolol. These medicines should be continued until EP follow up. 9 - You will need to start Carafate, Colchicine and Protonix for one month. This is to protect your esophagus from a possible injury during the ablation procedure. 10 - If chest pain occurs (especially when taking a deep breath), it is likely due to pericarditis or inflammation around your heart sac which is related to the ablation procedure. It usually responds to ibuprofen at 400-600 mg every 6-8 hours as needed.  If feels severe or unrelieved, please call office to discuss symptoms with the triage nurse. 11 - Call office with any questions. 12 - Relax (no heavy lifting/working) for next 48-72 hours. 13 - Reduced activity for 1-2 weeks. Walking ok, driving a car ok after 72 hours. Would avoid being outside in the hottest part of the day. Moderate to intense exercise should be avoided until further direction by Dr. Nikki Calderon. 14 -Most patients can have mild flu like symptoms post AFib ablation which usually improves over the course of 1-2 weeks. If there are alarming symptoms such as near fainting, fevers, chills or worsening shortness of breath or chest pain this should prompt a call to our office. If an emergency, call 911.     15 - Office follow up in 1 month or as needed. Sedation for a Medical Procedure: Care Instructions     You were given a sedative medication during your visit. While many of the effects will have worn   off before you leave; you may continue to feel some effects for several hours. Common side effects from sedation include:  Feeling sleepy. (Your doctors and nurses will make sure you are not too sleepy to go home.)  Nausea and vomiting. This usually does not last long. Feeling tired. How can you care for yourself at home? Activity    Don't do anything for 24 hours that requires attention to detail. It takes time for the medicine effects to completely wear off. Do not make important legal decisions for 24 hours. Do not sign any legal documents for 24 hours. Do not drink alcohol today     For your safety, you should not drive or operate heavy machinery for the remainder of the day     Rest when you feel tired. Getting enough sleep will help you recover. Diet    You can eat your normal diet, unless your doctor gives you other instructions. If your stomach is upset, try clear liquids and bland, low-fat foods like plain toast or rice. Drink plenty of fluids (unless your doctor tells you not to). What to Expect after your Ablation             During the first 48 hours after an ablation, some patients experience:    Mild Chest Discomfort - for a week or so, due to post procedure inflammation. The pain will often worsen with a deep breath or when leaning forward. It should resolve within a week. Mild shortness of breath with activity    Mild to moderate fatigue - this may last 1-3 weeks    Soreness and bruising in the groin area. This bruising may extend down past the knee. This bruising will go away slowly over a few weeks. During the first month after an ablation, some patients may experience:    Palpitations, fast heart rates can be normal first 6 weeks is the healing phase.     Recurrence of the arrhythmia during this time is not an indicator of failure of the ablation. You will generally have two sets of puncture sites one on each side of the groin, keep area clean. You may shower when you get home and remove the band aides. DO NOT go in a tub bath, pool, ocean, or lake until completely healed 7-10 days. Avoid any lotions, powder or creams to the puncture sites. You may notice a lump at the puncture site smaller than the size of a quarter this is normal.           You will be scheduled with your electrophysiologist in 4-6 weeks after the procedure           Activity Restrictions:    First two days post ablation, you should take it easy.     Do not drive for 24 hours post ablation    Do not lift, pull or push anything greater than 5-10 pounds for 7 days    You may resume normal activity after 1 week, but avoid strenuous activities for 2 weeks      Call us immediately at [phone] for:  Signs of infection, such as fever over 100 degrees F within the first 3 weeks post procedure, drainage from the puncture sites, redness swelling, hot to touch or severe pain.    Lump larger than the size of a quarter near the puncture sites, increasing more painful or seem to be throbbing. Severe chest pain with deep breath or when leaning forward, or shortness of breath    Slurred speech, difficulties swallowing, loss of sensation, decrease strength in hands or legs or any other stroke like symptoms    Severe chest pain or difficulty breathing      I have read the above instructions and have had the opportunity to ask questions. Patient: ________________________   Date: _____________    Witness: _______________________   Date: _____________               [address], [address]  PHONE: [phone]      To Whom It May Concern:          Amilcar SPARKSPorsha Grace [place] was in our facility for a scheduled procedure 12/14/2022 . May return to work 12/28/2022 . Please consider this an excused absence. If you have any questions feel free to contact [facility].

## 2022-12-14 NOTE — PROGRESS NOTES
TRANSFER - IN REPORT:    Verbal report received from [id number] on Marisa Beltran  being received from EP lab for routine progression of patient care      Report consisted of patient's Situation, Background, Assessment and   Recommendations(SBAR). Information from the following report(s) Nurse Handoff Report and Cardiac Rhythm NSR  was reviewed with the receiving nurse. Opportunity for questions and clarification was provided. Assessment completed upon patient's arrival to unit and care assumed.

## 2022-12-14 NOTE — Clinical Note
Transseptal Cath Performed under hemodynamic and ICE, utilizing a standard needle, [place]point 71cm via a guiding sheath.

## 2022-12-14 NOTE — Clinical Note
TRANSFER - OUT REPORT:     Verbal report given to: PACU RN, (at bedside). Report consisted of patient's Situation, Background, Assessment and   Recommendations(SBAR). Opportunity for questions and clarification was provided. Patient transported with a Registered Nurse. Patient transported to: recovery.

## 2022-12-14 NOTE — PROGRESS NOTES
Discharge instructions given per orders, voiced good understanding of Bilateral groin site care, medications & follow up care. Denies any questions.

## 2022-12-14 NOTE — ANESTHESIA PROCEDURE NOTES
Airway  Date/Time: 12/14/2022 11:37 AM  Urgency: elective    Airway not difficult    General Information and Staff    Patient location during procedure: OR  Resident/CRNA: FILIPPO Sosa - CRNA  Performed: resident/CRNA     Indications and Patient Condition  Indications for airway management: anesthesia and airway protection  Spontaneous Ventilation: absent  Sedation level: deep  Preoxygenated: yes  MILS maintained throughout  Mask difficulty assessment: vent by bag mask    Final Airway Details  Final airway type: endotracheal airway      Successful airway: ETT  Cuffed: yes   Successful intubation technique: direct laryngoscopy  Endotracheal tube insertion site: oral  Blade: Amaya  Blade size: #3  ETT size (mm): 8.0  Cormack-Lehane Classification: grade IIa - partial view of glottis  Placement verified by: chest auscultation and capnometry   Measured from: lips  ETT to lips (cm): 24  Number of attempts at approach: 1  Ventilation between attempts: bag mask  Number of other approaches attempted: 1    no

## 2022-12-14 NOTE — PROGRESS NOTES
Assisted OOB & ambulated to BR & on unit. Tolerated activity without difficulty. Bilateral groin sites without bleeding or hematoma.

## 2022-12-14 NOTE — Clinical Note
Sheath was exchanged in the right femoral vein with INTRODUCER SHTH SL1 0.032 IN 8.5 FRX63 CM FAST-CATH.

## 2022-12-14 NOTE — Clinical Note
Sheath was exchanged in the right femoral vein with SHEATH GUID 11.5X8. 5FR L71MM M CRV L22MM BIDIR [employer].

## 2022-12-14 NOTE — PERIOP NOTE
TRANSFER - OUT REPORT:    Verbal report given to BRUCE Tomlinson on [place]  being transferred to Cath lab for routine post-op       Report consisted of patients Situation, Background, Assessment and   Recommendations(SBAR). Information from the following report(s) Adult Overview, Surgery Report, Intake/Output, and MAR was reviewed with the receiving nurse. Lines:   Peripheral IV 12/14/22 Right Antecubital (Active)   Site Assessment Clean, dry & intact 12/14/22 1335   Line Status Infusing 12/14/22 1335   Phlebitis Assessment No symptoms 12/14/22 1335   Infiltration Assessment 0 12/14/22 1335   Dressing Status Clean, dry & intact 12/14/22 1335   Dressing Type Transparent 12/14/22 1335       Peripheral IV 12/14/22 Left Antecubital (Active)   Site Assessment Clean, dry & intact 12/14/22 1335   Phlebitis Assessment No symptoms 12/14/22 1335   Infiltration Assessment 0 12/14/22 1335   Dressing Status Clean, dry & intact 12/14/22 1335   Dressing Type Transparent 12/14/22 1335        Opportunity for questions and clarification was provided. Patient transported with:   O2 @ 3 liters    VTE prophylaxis orders have not been written for [place].

## 2022-12-14 NOTE — PROCEDURES
: Terrence Hinds. Bernardo Clifford MD    REFERRING: Izzy Rodriguez MD    Pre-Electrophysiology Diagnosis  1. Atrial fibrillation, paroxysmal  2. Atrial flutter     Procedure Performed  1. EPS afib ablation/pulmonary vein isolation  2. Left atrial pacing recording from the coronary sinus. 3. 3-D Electroanatomical mapping  4. Intracardiac echo  5. Ablation of second arrhythmia  6. LV pacing and recording  7. Transesophageal echo  8. Drug infusion    Anesthesia: General     Estimated Blood Loss: Less than 50 cc     Specimens: * No specimens in log *    Antibiotics: None    Complications: None    Fluoroscopy Time: 0 minutes     Procedure in Detail:  The patient was brought to the electrophysiology lab in the fasting state. The patient was intubated by anesthesiology and an esophageal temperature probe inserted and advanced to a location directly posterior to the LA at the level of the pulmonary veins. The esophageal temperature probe was repositioned throughout the case to a location as close the the ablation catheter as possible. If the esophageal temperature increased 0.5 degrees Celsius ablation was stopped and high flush performed until the temperature returned to baseline. A tranesophageal echocardiogram was performed directly prior to the procedure and was negative for a EVA thrombus (see full report in chart). A Ref-Star CARTO patch was placed, the patient was then prepped and draped in sterile fashion. Venous access was obtained under ultrasound guidance x4 using modified Seldinger technique, with placement of three 8 Fr short sidearm sheaths into the right femoral vein and placement of a 10 Fr sheath into the left femoral vein. Two of the 8 Fr sheaths were upsized to an 8.5 Fr CadiaGuide (Biosense-Cantu) and Vizigo (Biosense-Cantu) sheaths, respectively. The patient presented to the EP lab in normal sinus rhythm.   An intra-cardiac echo probe was prepped, and then inserted into an 10 Fr short sheath and used to localize the fossa ovalis and create LA and pulmonary vein anatomy utilizing CARTO Sound technology. A Biosense Cantu Pentaray catheter was then inserted into an 8.5 CardiaGuide sheath and used to create a 3D electroanatomical map of the right atrium, including attempts at His localization and the os of the CS with a focus on minimizing fluoroscopic radiation. Then a Smart Touch porous irrigated BW 3.5 mm ablation catheter was used to map out the body of the CS. A decapolar Biosense Cantu CS catheter was inserted via an 8Fr sheath and positioned in the mid coronary sinus. Next, a trans-septal needle was inserted into the CardiaGuide sheath and was used to perform a trans-septal puncture with assistance from ICE (intra-cardiac echo) as well as the LA Carto Sound map and the right atrial impedence map. The SL1 sheath was advanced into the LA. Total weight based heparin bolus was administered just after transeptal access, and systemic blood pressure monitored invasively. The patient was then placed on our heparin weight based nomogram for targeted ACT of 300-350 during the ablation procedure. A second trans-septal access was obtained with the ablation catheter using the \"fatmata-wire\" technique. The sheaths were carefully advanced into the LA. The Pentaray catheter was then inserted into the LA via the CardiaGuide sheath and was used to map pulmonary vein potentials and target ablation. An 8 Fr, 3.5 mm tip saline irrigated bidirectional D/F curve Thermo-cool SmartTouch catheter was used for RF ablation and ablation was performed at 40-45 W unless ablation was in the proximity of the esophagus where ablation was performed at 31-35 W. The esophagus was located nearest the left inferior PV. Antral pulmonary vein isolation was then performed for all 4 pulmonary veins with ablation index targets of 500 and 380 on the anterior and posterior walls, respectively.  Entrance and exit block was demonstrated by left atrial pacing and within the pulmonary veins. Lack of any conducted atrial EGMs into the pulmonary veins was documented. Prior to ablation of the right antral pulmonary veins, the ablation catheter was used to pace at high output to try to localize the right phrenic nerve and map its location prior to ablation. Adenosine was injected (12 mg) to demonstrate the lack of early reconnection with the Pentaray in the PVs. There was no early reconnection with adenosine. The ablation catheter was inserted into the LV, documented LV electrograms and was used to pace the LV for the EP study and for rescue pacing during adenosine infusion. Cavotricuspid Isthmus ablation (right atrial flutter)  Linear ablation across the cavo-tricuspid isthmus was performed starting with 1:2 A:V EGMs along the isthmus at 6pm HARISH. The local electrogram activation sequence, differential pacing maneuvers and electrogram timing was used to demonstrate bidirectional block along the cavotricuspid isthmus. Post-Ablation trans-isthmus time: 145 msec  Local double potentials: 90 msec    Next, baseline recordings and a diagnostic EP study was performed. The coronary sinus multipolar catheter was used to pace the left atrium during the EP study. The LA CS electrograms were documented and interpreted during the procedure. A comprehensive EP study was performed with 1:1 AV decremental pacing, atrial extrastimuli and ventricular pacing to assess retrograde conduction. The patient did not have sustained slow pathway conduction or evidence of an accessory pathway. Ventricular pacing revealed retrograde AV conduction which was concentric and decremental.    At the completion of the ablation and EPS, all catheters were removed, 100 mg of Protamine was administered and all long sheaths were exchanged for short 8 Fr sheaths. Four VASCADE MVP devices were used to close the venotomy sites.  The MVP tools were advanced into the 8 Fr and 10 Fr sheaths, respectively; the sheaths were then removed. The collagen was then deployed and a 30 second dwell time was performed to allow for expansion of the collagen. The delivery tools were then removed with adequate hemostasis. The patient tolerated the procedure well with no acute complications recognized. The patient left the EP lab in stable condition, in normal sinus rhythm. Just prior to pulling shealths, the ICE catheter was used to obtain ultrasound images and revealed no evidence of pericardial effusion. ABLATION DATA:  Total procedure time: 94 minutes  Total RF time: 24 minutes 49 seconds  LA Volume: 94 cc     Baseline Intervals:  AH interval: 86 msec  HV interval: 42 msec  VA interval: 157 msec  QRS duration: 74 msec  QT interval: 409 msec  RR interval: 991 msec    EP Study  AV Wenchebach: 300 msec  AV danni ERP: < or equal to 600/240 msec  Atrial ERP: 600/240 msec  VA Wenchebach: 320 msec    Figure 1. 3D electroanatomic map of the posterior left atrium pre and post AF ablation. Figure 2. Baseline ECG tracing. Plan of care: The patient will be placed in the same-day discharge protocol, 3-4 hour flat time, followed by ambulation as tolerated and will continue anticoagulation as prescribed pre-procedure. Impression:   1. Successful pulmonary vein isolation (PVAI) of the 4 pulmonary veins. 2.  Successful ablation of CTI-dependent atrial flutter. 3.  Comprehensive EP study with normal intra-atrial, AV danni and infrahissian conduction times. Plan:   1. Continue OAC (Eliquis) indefinitely until EP follow up. 2.  Continue AAD (metoprolol) for at least 4-6 weeks and stop if no AF. 3.  Family updated with plan. 4.  Routine cardiology follow up with Dr. Arnav Dobbs.  5.  Patient and family updated about small risk of atrioesophageal fistula and need for emergent ED evaluation if any concerning symptoms arise. Rob Lees MD, MS  Clinical Cardiac Electrophysiology  12/14/2022  1:17 PM

## 2022-12-14 NOTE — PROGRESS NOTES
Pt arrived, ambulated to room with no visible problems, planned PING/ Afib Ablation for Dr Bernardo Clifford. Consent signed, Procedure discussed with pt all questions answered voiced understanding. Medications and history discussed with pt.     Pt prepped per orders The patient has a fraility score of 3-MANAGING WELL, based on age, no need for assistance

## 2022-12-19 NOTE — ANESTHESIA POSTPROCEDURE EVALUATION
Department of Anesthesiology  Postprocedure Note    Patient: Lalita Geller  MRN: [id number]  YOB: 1975  Date of evaluation: 12/19/2022      Procedure Summary     Date: 12/14/22 Room / Location: [facility] EP/CATH 4 ALL EVENTS / [facility] CARDIAC CATH LAB    Anesthesia Start: 1122 Anesthesia Stop: 4119    Procedure: Ablation A-fib w complete ep study Diagnosis:       Paroxysmal atrial fibrillation (HCC)      (Atrial fibrillation ([address] [I48.91])    Providers: Laura Mandujano MD Responsible Provider: Eliane Diamond MD    Anesthesia Type: general ASA Status: 2          Anesthesia Type: No value filed.     Raphael Phase I: Raphael Score: 9    Raphael Phase II: Raphael Score: 8      Anesthesia Post Evaluation    Patient location during evaluation: PACU  Patient participation: complete - patient participated  Level of consciousness: awake and alert  Airway patency: patent  Nausea & Vomiting: no nausea and no vomiting  Complications: no  Cardiovascular status: hemodynamically stable  Respiratory status: acceptable, nonlabored ventilation and spontaneous ventilation  Hydration status: euvolemic  Comments: BP (!) 134/95   Pulse 84   Temp 97.8 °F (36.6 °C) (Temporal)   Resp 16   Ht 5' 9\" (1.753 m)   Wt 205 lb 0.4 oz (93 kg)   SpO2 98%   BMI 30.28 kg/m²     Multimodal analgesia pain management approach

## 2023-01-10 RX ORDER — SUCRALFATE 1 G/1
TABLET ORAL
Qty: 120 TABLET | Refills: 0 | OUTPATIENT
Start: 2023-01-10

## 2023-01-10 RX ORDER — PANTOPRAZOLE SODIUM 40 MG/1
TABLET, DELAYED RELEASE ORAL
Qty: 60 TABLET | Refills: 0 | OUTPATIENT
Start: 2023-01-10

## 2023-01-10 NOTE — TELEPHONE ENCOUNTER
Requested Prescriptions     Refused Prescriptions Disp Refills    sucralfate (CARAFATE) 1 GM tablet [Pharmacy Med Name: SUCRALFATE 1 GM TABLET] 120 tablet 0     Sig: TAKE 1 TABLET BY MOUTH FOUR TIMES A DAY     Refused By: Lebron Stein     Reason for Refusal: Refill not appropriate    pantoprazole (PROTONIX) 40 MG tablet [Pharmacy Med Name: PANTOPRAZOLE SOD DR 40 MG TAB] 60 tablet 0     Sig: TAKE 1 TABLET BY MOUTH TWICE A DAY BEFORE MEALS     Refused By: Lebron Stein     Reason for Refusal: Refill not appropriate

## 2023-01-24 ENCOUNTER — OFFICE VISIT (OUTPATIENT)
Dept: CARDIOLOGY CLINIC | Age: 48
End: 2023-01-24
Payer: COMMERCIAL

## 2023-01-24 VITALS
HEIGHT: 69 IN | HEART RATE: 58 BPM | WEIGHT: 202 LBS | BODY MASS INDEX: 29.92 KG/M2 | SYSTOLIC BLOOD PRESSURE: 118 MMHG | DIASTOLIC BLOOD PRESSURE: 78 MMHG

## 2023-01-24 DIAGNOSIS — I48.0 PAROXYSMAL ATRIAL FIBRILLATION (HCC): Primary | ICD-10-CM

## 2023-01-24 PROCEDURE — 99214 OFFICE O/P EST MOD 30 MIN: CPT | Performed by: INTERNAL MEDICINE

## 2023-01-24 PROCEDURE — 93000 ELECTROCARDIOGRAM COMPLETE: CPT | Performed by: INTERNAL MEDICINE

## 2023-01-24 RX ORDER — AMIODARONE HYDROCHLORIDE 100 MG/1
200 TABLET ORAL 2 TIMES DAILY PRN
Qty: 30 TABLET | Refills: 3 | Status: SHIPPED | OUTPATIENT
Start: 2023-01-24

## 2023-01-24 RX ORDER — NITROGLYCERIN 0.4 MG/1
0.4 TABLET SUBLINGUAL EVERY 5 MIN PRN
Qty: 25 TABLET | Refills: 1 | Status: SHIPPED | OUTPATIENT
Start: 2023-01-24

## 2023-01-24 ASSESSMENT — ENCOUNTER SYMPTOMS
GASTROINTESTINAL NEGATIVE: 1
ALLERGIC/IMMUNOLOGIC NEGATIVE: 1
EYES NEGATIVE: 1
SHORTNESS OF BREATH: 0

## 2023-01-24 NOTE — PROGRESS NOTES
[facility]  [address] [place] [address], [address] [facility], [address] [place] [address]  PHONE: [phone]        23      NAME:  Kristy Kathleen  : 1975  MRN: [id number]     Referring Cardiologist: Jeevan Cat MD    Reason for Consultation: Atrial fibrillation    ASSESSMENT and PLAN:  John Armijo was seen today for consultation and atrial fibrillation. Diagnoses and all orders for this visit:    Paroxysmal atrial fibrillation ([address] s/p AF ablation 2022. Coronary artery disease involving native coronary artery of native heart with unstable angina pectoris [facility]) s/p PCI. 49year old male with a history of pAF. He is now s/p AF ablation. Doing well for the most part with a few recurrences of AF but mainly in NSR. -AF - s/p AF ablation, metoprolol, Eliquis and PRN amiodarone. -CAD - OMT per Dr. Clarita Smith. May consider repeat [facility]. -EP Follow up in 6 months or PRN. Patient has been instructed and agrees to call our office with any issues or other concerns related to their cardiac condition(s) and/or complaint(s). No follow-up provider specified. Thank you for allowing me to participate in the electrophysiologic care of Mr. Kristy Kathleen. Please contact me if any questions or concerns were to arise. Romi Lees MD, MS  Clinical Cardiac Electrophysiology  [facility]  23  8:56 AM    ===================================================================  Chief Complant:    Chief Complaint   Patient presents with    Irregular Heart Beat    Follow-up        Consultation is requested by Cassandra De La Rosa MD for evaluation of Irregular Heart Beat and Follow-up    History:  Kristy Kathleen is a most pleasant 49 y.o. male with a past medical and cardiac history significant for a history of AF. He is referred by Dr. Clarita Smith. He comes in to discuss his AF. He was first diagnosed in  after a PCI to his LAD.  He's been having more episodes lately and is highly symptomatic. He has failed medical therapy. He is not currently on [facility]. He comes in for follow up. He is s/p AF ablation. He has been doing pretty good but still with a few episodes of AF. He's also concerned about blockages in his heart to which he will discuss with Dr. Honor Epley. The patient otherwise denies chest pain, dyspnea, presyncope, syncope or lateralizing symptoms. He is getting  soon. He is a manager of a maintenance team.     Cardiac PMH: (Old records have been reviewed and summarized below)    AF ablation: 2022       EKG:  (EKG has been independently visualized by me with interpretation below): NSR, normal axis, no ischemia. ECHO: 2020  -  Left ventricle: Systolic function was normal. Ejection fraction was  estimated in the range of 55 % to 60 %. There were no regional wall motion  abnormalities. Left ventricular diastolic function parameters were normal.   E/e' av.66.  -  Right ventricle: There was no pulmonary artery hypertension.  -  Tricuspid valve: There was trivial regurgitation. Previous Heart Catheterization: 2017  HEMODYNAMIC ASSESSMENT   1. Aortic pressure was 102/61 with a mean of 76.   2. Left ventricular end-diastolic pressure of 13.   3. No significant gradient across the aortic valve. ANGIOGRAPHIC RESULTS    1. Left main coronary artery: Large-caliber vessel. Angiographically normal.    2. LAD: Significantly diseased in the proximal segment. Up to 70%   stenosis is noted. Distal vessel is patent. 3. First diagonal artery: Small-caliber vessel. Contains a 40% mid   stenosis. 4. Second diagonal artery: Small- to medium-caliber vessel. Patent. 5. Left circumflex: Medium-caliber, dominant vessel. Contains a   30% mid stenosis. 6. First obtuse marginal: Is a medium-caliber vessel. Contains 40%   proximal stenosis. 7. Second obtuse marginal artery: Very small-caliber, 1.5 mm   vessel. 80% proximal stenosis.     8. Left posterolateral branch 1 is a medium-caliber vessel. 20%   proximal stenosis. 9. Left PDA: A medium-caliber vessel. Diffuse 30% proximal   stenosis. 10. Right coronary artery: Medium-caliber, nondominant vessel. 30%   proximal stenosis. 11. Left ventriculogram performed in FITCH projection shows normal   left ventricular systolic function, EF 91% to 65%. Aortic root   is nondilated. No mitral regurgitation. Stress Test: n/a     DEVICE INTERROGATION: n/a     Past Medical History, Past Surgical History, Family history, Social History, and Medications were all reviewed with the patient today and updated as necessary. Current Outpatient Medications   Medication Sig Dispense Refill    nitroGLYCERIN (NITROSTAT) 0.4 MG SL tablet Place 1 tablet under the tongue every 5 minutes as needed for Chest pain 25 tablet 1    apixaban (ELIQUIS) 5 MG TABS tablet Take 1 tablet by mouth 2 times daily 60 tablet 5    metoprolol succinate (TOPROL XL) 25 MG extended release tablet Take 1 tablet by mouth daily 90 tablet 3    ezetimibe-simvastatin (VYTORIN) 10-20 MG per tablet Take 1 tablet by mouth nightly 90 tablet 3    levothyroxine (SYNTHROID) 25 MCG tablet Take 25 mcg by mouth every morning (before breakfast)       No current facility-administered medications for this visit.      No Known Allergies    Past Medical History:   Diagnosis Date    B12 deficiency 10/8/2020    Coronary artery disease involving native coronary artery of native heart with unstable angina pectoris ([facility] 1/12/2017    Other ill-defined conditions(799.89)     kidney stones    Other specified hypothyroidism 12/22/2021     Past Surgical History:   Procedure Laterality Date    EP DEVICE PROCEDURE N/A 12/14/2022    Ablation A-fib w complete ep study performed by Brenda Cr MD at [address] [place] [address] CATH LAB     Family History   Problem Relation Age of Onset    No Known Problems Mother     Heart Disease Father     Heart Disease Brother     Heart Disease Paternal Grandfather Social History     Tobacco Use    Smoking status: Never    Smokeless tobacco: Never   Substance Use Topics    Alcohol use: No       ROS:  A comprehensive review of systems was performed with the pertinent positives and negatives as noted in the HPI in addition to:  Review of Systems   Constitutional: Negative. HENT: Negative. Eyes: Negative. Respiratory:  Negative for shortness of breath. Cardiovascular:  Positive for palpitations. Gastrointestinal: Negative. Endocrine: Negative. Genitourinary: Negative. Musculoskeletal: Negative. Skin: Negative. Allergic/Immunologic: Negative. Neurological: Negative. Hematological: Negative. Psychiatric/Behavioral: Negative. All other systems reviewed and are negative. PHYSICAL EXAM:   /78   Pulse 58   Ht 5' 9\" (1.753 m)   Wt 202 lb (91.6 kg)   BMI 29.83 kg/m²      Wt Readings from Last 3 Encounters:   01/24/23 202 lb (91.6 kg)   12/14/22 205 lb 0.4 oz (93 kg)   12/08/22 206 lb 12.8 oz (93.8 kg)     BP Readings from Last 3 Encounters:   01/24/23 118/78   12/14/22 (!) 134/95   12/08/22 128/86       Gen: Well appearing, well developed, no acute distress  Eyes: Pupils equal, round. Extraocular movements are intact  ENT: Oropharynx clear, no oral lesions, normal dentition  CV: S1S2, regular rate and rhythm, no murmurs, rubs or gallops, normal JVD, no carotid bruits, normal distal pulses, no MODESTA  Pulm: Clear to auscultation bilaterally, no accessory muscle uses, no wheezes or rales  GI: Soft, NT, ND, +BS  Neuro: Alert and oriented, nonfocal  Psych: Appropriate affect  Skin: Normal color and skin turgor  MSK: Normal muscle bulk and tone    Medical problems and test results were reviewed with the patient today. No results found for any visits on 01/24/23.

## 2023-02-10 ENCOUNTER — OFFICE VISIT (OUTPATIENT)
Dept: CARDIOLOGY CLINIC | Age: 48
End: 2023-02-10

## 2023-02-10 VITALS
WEIGHT: 201 LBS | BODY MASS INDEX: 29.77 KG/M2 | DIASTOLIC BLOOD PRESSURE: 80 MMHG | HEART RATE: 68 BPM | SYSTOLIC BLOOD PRESSURE: 120 MMHG | HEIGHT: 69 IN

## 2023-02-10 DIAGNOSIS — I25.10 ASCVD (ARTERIOSCLEROTIC CARDIOVASCULAR DISEASE): ICD-10-CM

## 2023-02-10 DIAGNOSIS — I48.0 PAF (PAROXYSMAL ATRIAL FIBRILLATION) (HCC): Primary | ICD-10-CM

## 2023-02-10 NOTE — PROGRESS NOTES
[facility]  [address], [address]  PHONE: [phone]        02/10/23        NAME:  Barbee Nyhan  : 1975  MRN: [id number]     CHIEF COMPLAINT:    Atrial Fibrillation      SUBJECTIVE:     6 weeks post a fib ablation. 3 weeks ago sxs of rapid a fib. and saw EP in f/u and amiodarone was prescribed but pt declined. + nocturnal arm pain and numbness         Medications were all reviewed with the patient today and updated as necessary. Current Outpatient Medications   Medication Sig    nitroGLYCERIN (NITROSTAT) 0.4 MG SL tablet Place 1 tablet under the tongue every 5 minutes as needed for Chest pain    amiodarone (PACERONE) 100 MG tablet Take 2 tablets by mouth 2 times daily as needed (afib) (Patient taking differently: Take 200 mg by mouth Pt takes as needed)    apixaban (ELIQUIS) 5 MG TABS tablet Take 1 tablet by mouth 2 times daily    metoprolol succinate (TOPROL XL) 25 MG extended release tablet Take 1 tablet by mouth daily    ezetimibe-simvastatin (VYTORIN) 10-20 MG per tablet Take 1 tablet by mouth nightly    levothyroxine (SYNTHROID) 25 MCG tablet Take 25 mcg by mouth every morning (before breakfast)     No current facility-administered medications for this visit. No Known Allergies        PHYSICAL EXAM:     Wt Readings from Last 3 Encounters:   02/10/23 201 lb (91.2 kg)   23 202 lb (91.6 kg)   22 205 lb 0.4 oz (93 kg)     BP Readings from Last 3 Encounters:   02/10/23 120/80   23 118/78   22 (!) 134/95       /80   Pulse 68   Ht 5' 9\" (1.753 m)   Wt 201 lb (91.2 kg)   BMI 29.68 kg/m²     Physical Exam  Vitals reviewed. HENT:      Head: Normocephalic and atraumatic. Eyes:      Extraocular Movements: Extraocular movements intact. Pupils: Pupils are equal, round, and reactive to light. Cardiovascular:      Rate and Rhythm: Normal rate. Heart sounds: Normal heart sounds.    Pulmonary: Effort: Pulmonary effort is normal.      Breath sounds: Normal breath sounds. Abdominal:      General: Abdomen is flat. Palpations: Abdomen is soft. There is no mass. Musculoskeletal:         General: Normal range of motion. Cervical back: Normal range of motion. Skin:     General: Skin is warm and dry. Neurological:      General: No focal deficit present. Mental Status: He is alert and oriented to person, place, and time. Psychiatric:         Mood and Affect: Mood normal.         RECENT LABS AND RECORDS REVIEW          ASSESSMENT and PLAN    [place] was seen today for atrial fibrillation. Diagnoses and all orders for this visit:    PAF (paroxysmal atrial fibrillation) ([facility]  Cont. Current rx  ASCVD (arteriosclerotic cardiovascular disease)   Cont current rx    His arm sxs are neuromsk in nature. He will see his Pcp for further eval.      Return in about 6 months (around 8/10/2023).        Raz Lloyd MD  2/10/2023  4:10 PM

## 2025-02-03 ENCOUNTER — TELEPHONE (OUTPATIENT)
Age: 50
End: 2025-02-03

## 2025-02-03 DIAGNOSIS — I48.0 PAF (PAROXYSMAL ATRIAL FIBRILLATION) (HCC): Primary | ICD-10-CM

## 2025-02-03 NOTE — TELEPHONE ENCOUNTER
JOSEFA from pt stating he has been having episodes again with A.fib had an ablation 12/22 by Dr Lees pt said he had episode yest. With felt like A.fib lasted about 6 hrs could not check Hr. Last one before that was 2 mos ago. Pt said he feels fine this am, also states he is not on any present medicine. Nurse will check with Dr Lees and then will let pt know what to do. Pt said he is wondering if he needs to wear a monitor?

## 2025-02-03 NOTE — TELEPHONE ENCOUNTER
Per Dr Lees have pt get a 14 day monitor and f/u appt. With him also tell pt to take 1 Asa 81mg qd.  Pt notified and said ok,  appt's made for pt and also advised pt if symptoms return he can always goto the Er. Pt said ok.

## 2025-02-25 ENCOUNTER — TELEPHONE (OUTPATIENT)
Age: 50
End: 2025-02-25

## 2025-02-25 NOTE — TELEPHONE ENCOUNTER
I rhythm notification- On 2/19/25 patient had 67 runs of SVT- symptomatic runs. 4 minutes 8 seconds - Max rate 218. 5 minutes 45 seconds Avg 178- symptom triggered. Patient reported no symptoms that day.

## 2025-02-27 RX ORDER — METOPROLOL SUCCINATE 25 MG/1
25 TABLET, EXTENDED RELEASE ORAL 2 TIMES DAILY
Qty: 90 TABLET | Refills: 3
Start: 2025-02-27

## 2025-02-27 NOTE — ANESTHESIA PRE PROCEDURE
Department of Anesthesiology  Preprocedure Note       Name:  Queenie Gtz   Age:  49 y.o.  :  1975                                          MRN:  [id number]         Date:  2022      Surgeon: Hilda Sullivan):  Lauren Rucker MD    Procedure: Procedure(s):  Ablation A-fib w complete ep study    Medications prior to admission:   Prior to Admission medications    Medication Sig Start Date End Date Taking? Authorizing Provider   apixaban (ELIQUIS) 5 MG TABS tablet Take 1 tablet by mouth 2 times daily  Patient not taking: Reported on 2022   Lauren Rucker MD   metoprolol succinate (TOPROL XL) 25 MG extended release tablet Take 1 tablet by mouth daily  Patient not taking: Reported on 2022   Lacho Alcaraz MD   ezetimibe-simvastatin (VYTORIN) 10-20 MG per tablet Take 1 tablet by mouth nightly 22   Lacho Alcaraz MD   levothyroxine (SYNTHROID) 25 MCG tablet Take 25 mcg by mouth every morning (before breakfast) 21   Ar Automatic Reconciliation   nitroGLYCERIN (NITROSTAT) 0.4 MG SL tablet Place 0.4 mg under the tongue 12/10/20   Ar Automatic Reconciliation       Current medications:    No current facility-administered medications for this encounter.        Allergies:  No Known Allergies    Problem List:    Patient Active Problem List   Diagnosis Code    Chest pain R07.9    History of atrial fibrillation Z86.79    Paroxysmal atrial fibrillation (HCC) I48.0    Coronary artery disease involving native coronary artery of native heart with unstable angina pectoris (HCC) I25.110    Precordial pain R07.2    Macrocytic anemia D53.9    Other specified hypothyroidism E03.8    B12 deficiency E53.8       Past Medical History:        Diagnosis Date    B12 deficiency 10/8/2020    Coronary artery disease involving native coronary artery of native heart with unstable angina pectoris ([facility] [address] 2017    Other ill-defined conditions(799.89)     kidney stones    Other specified hypothyroidism 12/22/2021       Past Surgical History:  No past surgical history on file. Social History:    Social History     Tobacco Use    Smoking status: Never    Smokeless tobacco: Never   Substance Use Topics    Alcohol use: No                                Counseling given: Not Answered      Vital Signs (Current):   Vitals:    12/14/22 0850   BP: (!) 130/98   Pulse: 60   Temp: 98.3 °F (36.8 °C)   TempSrc: Oral   SpO2: 98%   Weight: 205 lb 0.4 oz (93 kg)   Height: 5' 9\" (1.753 m)                                              BP Readings from Last 3 Encounters:   12/14/22 (!) 130/98   12/08/22 128/86   08/30/22 (!) 122/98       NPO Status: Time of last liquid consumption: 2100                        Time of last solid consumption: 2100                        Date of last liquid consumption: 12/13/22                        Date of last solid food consumption: 12/13/22    BMI:   Wt Readings from Last 3 Encounters:   12/14/22 205 lb 0.4 oz (93 kg)   12/08/22 206 lb 12.8 oz (93.8 kg)   08/30/22 206 lb 4.8 oz (93.6 kg)     Body mass index is 30.28 kg/m².     CBC:   Lab Results   Component Value Date/Time    WBC 3.9 12/12/2022 09:42 AM    RBC 5.56 12/12/2022 09:42 AM    HGB 16.1 12/12/2022 09:42 AM    HCT 49.9 12/12/2022 09:42 AM    MCV 89.7 12/12/2022 09:42 AM    RDW 13.5 12/12/2022 09:42 AM     12/12/2022 09:42 AM       CMP:   Lab Results   Component Value Date/Time     12/12/2022 09:42 AM    K 4.4 12/12/2022 09:42 AM     12/12/2022 09:42 AM    CO2 26 12/12/2022 09:42 AM    BUN 13 12/12/2022 09:42 AM    CREATININE 0.90 12/12/2022 09:42 AM    GFRAA >60 12/22/2021 09:16 AM    AGRATIO 0.9 12/22/2021 09:16 AM    LABGLOM >60 12/12/2022 09:42 AM    GLUCOSE 85 12/12/2022 09:42 AM    PROT 7.5 12/22/2021 09:16 AM    CALCIUM 8.9 12/12/2022 09:42 AM    BILITOT 1.2 12/22/2021 09:16 AM    ALKPHOS 111 12/22/2021 09:16 AM    AST 12 12/22/2021 09:16 AM    ALT 17 12/22/2021 09:16 AM       POC Tests: No results for input(s): POCGLU, POCNA, POCK, POCCL, POCBUN, POCHEMO, POCHCT in the last 72 hours. Coags:   Lab Results   Component Value Date/Time    PROTIME 13.7 12/12/2022 09:42 AM    INR 1.0 12/12/2022 09:42 AM    APTT 34.8 10/02/2020 10:53 AM       HCG (If Applicable): No results found for: PREGTESTUR, PREGSERUM, HCG, HCGQUANT     ABGs: No results found for: PHART, PO2ART, AAV1RJN, KNW5BGF, BEART, Q7VOASFP     Type & Screen (If Applicable):  No results found for: LABABO, LABRH    Drug/Infectious Status (If Applicable):  No results found for: HIV, HEPCAB    COVID-19 Screening (If Applicable): No results found for: COVID19        Anesthesia Evaluation  Patient summary reviewed and Nursing notes reviewed  Airway: Mallampati: II  TM distance: >3 FB   Neck ROM: full  Mouth opening: > = 3 FB   Dental: normal exam         Pulmonary:Negative Pulmonary ROS and normal exam  breath sounds clear to auscultation                             Cardiovascular:Negative CV ROS  Exercise tolerance: good (>4 METS),   (+) angina:, CAD: no interval change, CABG/stent:, dysrhythmias: atrial fibrillation,         Rhythm: regular  Rate: normal                    Neuro/Psych:   Negative Neuro/Psych ROS              GI/Hepatic/Renal: Neg GI/Hepatic/Renal ROS            Endo/Other:    (+) hypothyroidism::., .                 Abdominal:             Vascular: negative vascular ROS. Other Findings:           Anesthesia Plan      general     ASA 2       Induction: intravenous. Anesthetic plan and risks discussed with patient.                         Ameena Bella MD   12/14/2022 Statement Selected

## 2025-02-27 NOTE — TELEPHONE ENCOUNTER
Frank Lees MD Bennett, Sherry, RN  Caller: Unspecified (2 days ago,  2:26 PM)  Saw that. He needs to be a beta blocker. Unsure of the reason it was ordered but he'll need follow up with Gayle or TOM

## 2025-02-27 NOTE — TELEPHONE ENCOUNTER
Patient notified to increase Metoprolol to 25 mg BID per Dr. Lees. Appt made for him to see Gayle KIM on 3/4/25 at 2pm. Patient voices understanding.     Requested Prescriptions     Pending Prescriptions Disp Refills    metoprolol succinate (TOPROL XL) 25 MG extended release tablet 90 tablet 3     Sig: Take 1 tablet by mouth in the morning and at bedtime

## 2025-02-27 NOTE — TELEPHONE ENCOUNTER
Frank Lees MD Bennett, Sherry, RN  Caller: Unspecified (2 days ago,  2:26 PM)  Increase to BID and have see Gayle I'd say.

## 2025-03-02 NOTE — PROGRESS NOTES
[address], [address]  [place] [address]  PHONE: [phone]    Amilcar Adan  1975    SUBJECTIVE:   Amilcar Adan is a 49 y.o. male seen for a follow up visit regarding the following:     Chief Complaint   Patient presents with    Atrial Fibrillation    Follow-up     HPI:    Amilcar Adan is a very pleasant 49 y.o. male with a past medical and cardiac history significant for CAD s/p PCI, paroxysmal atrial fibrillation s/p ablation 12/2022 . He is referred by Dr. Houser. He comes in to discuss his AF. He was first diagnosed in 2017 after a PCI to his LAD. He's been having more episodes lately and is highly symptomatic. He has failed medical therapy. He is not currently on OAC. He is now s/p ablation 12/14/22.     He called in with complaint of intermittent episodes of atrial fibrillation. 14 day monitor 2/5/25 showed no true AF but episodes of pSVT, likely pAT lasting 5-6 minutes with RVR. Stopped taking all medications around year ago. He presents today for follow up visit. He reports increased fatigue and palpitations. Reports increasing episodes of palpitation 3-4 times per week with associated shortness of breath. Denies any chest pain or dyspnea on exertion.        Cardiac PMH: (Old records have been reviewed and summarized below)  Cath (1/11/17):   1. Successful instantaneous fractional flow reserve measurement   and fractional flow reserve measurement of the proximal LAD. The   fractional flow reserve measurement was markedly abnormal at   0.69, indicating hemodynamically significant stenosis.   2. Successful percutaneous coronary intervention and stenting of   proximal left anterior descending with a Synergy 3.5 x 28 mm   drug-eluting stent.     TTE (9/21/20): EF 55-60%    Ablation (12/14/22):   1.  Successful pulmonary vein isolation (PVAI) of the 4 pulmonary veins.  2.  Successful ablation of CTI-dependent atrial flutter.    Monitor (2/5/25):

## 2025-03-03 ENCOUNTER — OFFICE VISIT (OUTPATIENT)
Age: 50
End: 2025-03-03
Payer: COMMERCIAL

## 2025-03-03 VITALS
WEIGHT: 203.4 LBS | HEIGHT: 69 IN | DIASTOLIC BLOOD PRESSURE: 98 MMHG | SYSTOLIC BLOOD PRESSURE: 132 MMHG | BODY MASS INDEX: 30.13 KG/M2 | HEART RATE: 74 BPM

## 2025-03-03 DIAGNOSIS — R00.2 PALPITATIONS: ICD-10-CM

## 2025-03-03 DIAGNOSIS — I48.0 PAF (PAROXYSMAL ATRIAL FIBRILLATION) (HCC): Primary | ICD-10-CM

## 2025-03-03 PROCEDURE — 93000 ELECTROCARDIOGRAM COMPLETE: CPT | Performed by: PHYSICIAN ASSISTANT

## 2025-03-03 PROCEDURE — 99214 OFFICE O/P EST MOD 30 MIN: CPT | Performed by: PHYSICIAN ASSISTANT

## 2025-03-03 RX ORDER — ASPIRIN 81 MG/1
81 TABLET, CHEWABLE ORAL DAILY
COMMUNITY

## 2025-03-03 RX ORDER — METOPROLOL SUCCINATE 25 MG/1
25 TABLET, EXTENDED RELEASE ORAL 2 TIMES DAILY
Qty: 90 TABLET | Refills: 3 | Status: SHIPPED | OUTPATIENT
Start: 2025-03-03

## 2025-03-07 NOTE — TELEPHONE ENCOUNTER
MEDICATION REFILL REQUEST      Name of Medication:  Metoprolol   Dose:  25 mg  Frequency:    Quantity:    Days' supply:  90 day       Pharmacy Name/Location:  [facility] / Please call patient when called in and please call in today , patient is out

## 2025-03-10 RX ORDER — METOPROLOL SUCCINATE 25 MG/1
25 TABLET, EXTENDED RELEASE ORAL 2 TIMES DAILY
Qty: 180 TABLET | Refills: 3 | Status: SHIPPED | OUTPATIENT
Start: 2025-03-10

## 2025-03-10 NOTE — TELEPHONE ENCOUNTER
Requested Prescriptions     Pending Prescriptions Disp Refills    metoprolol succinate (TOPROL XL) 25 MG extended release tablet 180 tablet 3     Sig: Take 1 tablet by mouth in the morning and at bedtime    Verified rx in last OV date 3/3/25. Pharmacy confirmed. Erx as requested

## 2025-04-23 ENCOUNTER — OFFICE VISIT (OUTPATIENT)
Age: 50
End: 2025-04-23
Payer: COMMERCIAL

## 2025-04-23 VITALS
HEART RATE: 60 BPM | HEIGHT: 69 IN | BODY MASS INDEX: 29.92 KG/M2 | SYSTOLIC BLOOD PRESSURE: 124 MMHG | DIASTOLIC BLOOD PRESSURE: 76 MMHG | WEIGHT: 202 LBS

## 2025-04-23 DIAGNOSIS — I25.10 ASCVD (ARTERIOSCLEROTIC CARDIOVASCULAR DISEASE): Primary | ICD-10-CM

## 2025-04-23 DIAGNOSIS — I48.0 PAF (PAROXYSMAL ATRIAL FIBRILLATION) (HCC): ICD-10-CM

## 2025-04-23 PROCEDURE — 99214 OFFICE O/P EST MOD 30 MIN: CPT | Performed by: INTERNAL MEDICINE

## 2025-04-23 RX ORDER — EZETIMIBE AND SIMVASTATIN 10; 20 MG/1; MG/1
1 TABLET ORAL NIGHTLY
Qty: 90 TABLET | Refills: 3 | Status: SHIPPED | OUTPATIENT
Start: 2025-04-23

## 2025-04-23 NOTE — PROGRESS NOTES
[facility] CARDIOLOGY  [address] [place] [address], [address]  [place] [address]  PHONE: [phone]        25        NAME:  Amilcar Adan  : 1975  MRN: [id number]     CHIEF COMPLAINT:    Follow-up, Atrial Fibrillation, and Coronary Artery Disease        SUBJECTIVE:     First visit w/ me 2 years.  He has had an ablation which went well.  Some palpitation persists.  No angina.       Medications were all reviewed with the patient today and updated as necessary.   Current Outpatient Medications   Medication Sig    ezetimibe-simvastatin (VYTORIN) 10-20 MG per tablet Take 1 tablet by mouth nightly    metoprolol succinate (TOPROL XL) 25 MG extended release tablet Take 1 tablet by mouth in the morning and at bedtime    aspirin 81 MG chewable tablet Take 1 tablet by mouth daily    nitroGLYCERIN (NITROSTAT) 0.4 MG SL tablet Place 1 tablet under the tongue every 5 minutes as needed for Chest pain (Patient not taking: Reported on 2025)     No current facility-administered medications for this visit.        No Known Allergies        PHYSICAL EXAM:     Wt Readings from Last 3 Encounters:   25 91.6 kg (202 lb)   25 92.3 kg (203 lb 6.4 oz)   02/10/23 91.2 kg (201 lb)     BP Readings from Last 3 Encounters:   25 124/76   25 (!) 132/98   02/10/23 120/80       /76   Pulse 60   Ht 1.753 m (5' 9.02\")   Wt 91.6 kg (202 lb)   BMI 29.82 kg/m²     Physical Exam  Vitals reviewed.   HENT:      Head: Normocephalic and atraumatic.   Eyes:      Extraocular Movements: Extraocular movements intact.      Pupils: Pupils are equal, round, and reactive to light.   Cardiovascular:      Rate and Rhythm: Normal rate.      Heart sounds: Normal heart sounds.   Pulmonary:      Effort: Pulmonary effort is normal.      Breath sounds: Normal breath sounds.   Abdominal:      General: Abdomen is flat.      Palpations: Abdomen is soft. There is no mass.   Musculoskeletal:         General: Normal range

## 2025-05-06 ENCOUNTER — CLINICAL SUPPORT (OUTPATIENT)
Age: 50
End: 2025-05-06
Payer: COMMERCIAL

## 2025-05-06 VITALS
BODY MASS INDEX: 29.52 KG/M2 | WEIGHT: 200 LBS | SYSTOLIC BLOOD PRESSURE: 120 MMHG | DIASTOLIC BLOOD PRESSURE: 82 MMHG | HEART RATE: 122 BPM

## 2025-05-06 DIAGNOSIS — I48.0 PAF (PAROXYSMAL ATRIAL FIBRILLATION) (HCC): Primary | ICD-10-CM

## 2025-05-06 DIAGNOSIS — I47.19 ATRIAL TACHYCARDIA: ICD-10-CM

## 2025-05-06 PROCEDURE — 93000 ELECTROCARDIOGRAM COMPLETE: CPT | Performed by: INTERNAL MEDICINE

## 2025-05-06 PROCEDURE — 99214 OFFICE O/P EST MOD 30 MIN: CPT | Performed by: INTERNAL MEDICINE

## 2025-05-06 RX ORDER — METOPROLOL SUCCINATE 100 MG/1
100 TABLET, EXTENDED RELEASE ORAL DAILY
Qty: 30 TABLET | Refills: 3 | Status: SHIPPED | OUTPATIENT
Start: 2025-05-06

## 2025-05-06 NOTE — PROGRESS NOTES
[facility] CARDIOLOGY  [address] [place] [address], [address]  [place] [address]  PHONE: [phone]        25        NAME:  Amilcar Adan  : 1975  MRN: [id number]     CHIEF COMPLAINT:    Tachycardia        SUBJECTIVE:     He returns with worsening palpitation.     Medications were all reviewed with the patient today and updated as necessary.   Current Outpatient Medications   Medication Sig    metoprolol succinate (TOPROL XL) 100 MG extended release tablet Take 1 tablet by mouth daily    ezetimibe-simvastatin (VYTORIN) 10-20 MG per tablet Take 1 tablet by mouth nightly    aspirin 81 MG chewable tablet Take 1 tablet by mouth daily    nitroGLYCERIN (NITROSTAT) 0.4 MG SL tablet Place 1 tablet under the tongue every 5 minutes as needed for Chest pain (Patient not taking: Reported on 2025)     No current facility-administered medications for this visit.        No Known Allergies        PHYSICAL EXAM:     Wt Readings from Last 3 Encounters:   25 90.7 kg (200 lb)   25 91.6 kg (202 lb)   25 92.3 kg (203 lb 6.4 oz)     BP Readings from Last 3 Encounters:   25 120/82   25 124/76   25 (!) 132/98       /82   Pulse (!) 122   Wt 90.7 kg (200 lb)   BMI 29.52 kg/m²     Physical Exam  Vitals reviewed.   HENT:      Head: Normocephalic and atraumatic.   Eyes:      Extraocular Movements: Extraocular movements intact.      Pupils: Pupils are equal, round, and reactive to light.   Cardiovascular:      Rate and Rhythm: Normal rate.      Heart sounds: Normal heart sounds.   Pulmonary:      Effort: Pulmonary effort is normal.      Breath sounds: Normal breath sounds.   Abdominal:      General: Abdomen is flat.      Palpations: Abdomen is soft. There is no mass.   Musculoskeletal:         General: Normal range of motion.      Cervical back: Normal range of motion.   Skin:     General: Skin is warm and dry.   Neurological:      General: No focal deficit present.

## 2025-05-07 RX ORDER — NITROGLYCERIN 0.4 MG/1
0.4 TABLET SUBLINGUAL EVERY 5 MIN PRN
Qty: 25 TABLET | Refills: 1 | Status: SHIPPED | OUTPATIENT
Start: 2025-05-07

## 2025-05-07 NOTE — TELEPHONE ENCOUNTER
Requested Prescriptions     Pending Prescriptions Disp Refills    nitroGLYCERIN (NITROSTAT) 0.4 MG SL tablet 25 tablet 1     Sig: Place 1 tablet under the tongue every 5 minutes as needed for Chest pain     Verified rx. Last OV 4/23/25. Erx to pharm on file.

## 2025-07-16 DIAGNOSIS — I47.19 ATRIAL TACHYCARDIA: ICD-10-CM

## 2025-07-16 RX ORDER — METOPROLOL SUCCINATE 25 MG/1
TABLET, EXTENDED RELEASE ORAL
Qty: 90 TABLET | Refills: 7 | OUTPATIENT
Start: 2025-07-16

## 2025-07-16 RX ORDER — METOPROLOL SUCCINATE 100 MG/1
100 TABLET, EXTENDED RELEASE ORAL DAILY
Qty: 90 TABLET | Refills: 3 | Status: SHIPPED | OUTPATIENT
Start: 2025-07-16

## 2025-07-16 NOTE — TELEPHONE ENCOUNTER
Requested Prescriptions     Pending Prescriptions Disp Refills    metoprolol succinate (TOPROL XL) 100 MG extended release tablet 90 tablet 3     Sig: Take 1 tablet by mouth daily     Refused Prescriptions Disp Refills    metoprolol succinate (TOPROL XL) 25 MG extended release tablet [Pharmacy Med Name: METOPROLOL SUCC ER 25 MG TAB] 90 tablet 7     Sig: TAKE 1 TABLET BY MOUTH IN THE MORNING AND IN THE EVENING     Refused By: ENDY QUIROZ     Reason for Refusal: Medication dose changed     Rx verified last ov 5/6/25

## 2025-07-16 NOTE — TELEPHONE ENCOUNTER
metoprolol succinate (TOPROL XL) 100 MG extended release tablet   [id number] [address] [place] - [address] - P [phone] - F [phone] [address] [place] [address]  Phone: [phone]  Fax: [phone]     Pt would like this rx filled

## (undated) DEVICE — CONNECTOR TBNG OD5-7MM O2 END DISP

## (undated) DEVICE — PATCH REF EXT FOR CARTO 3 SYS (EA = 6 PACKS)

## (undated) DEVICE — SHEATH GUID 11.5X8.5FR L71MM M CRV L22MM BIDIR STEER CARTO

## (undated) DEVICE — PINNACLE INTRODUCER SHEATH: Brand: PINNACLE

## (undated) DEVICE — CATHETER MAP 7FR L115CM 2-6-2 SPC D CRV 22 ELECTRD PENTARAY

## (undated) DEVICE — CATHETER ABLAT 8FR L115CM 1-6-2MM SPC TIP 3.5MM DF CRV

## (undated) DEVICE — BLOCK BITE AD 60FR W/ VELC STRP ADDRESSES MOST PT AND

## (undated) DEVICE — PRESSURE MONITORING SET: Brand: TRUWAVE

## (undated) DEVICE — Device: Brand: NRG TRANSSEPTAL NEEDLE

## (undated) DEVICE — 18G NG KIT WITH 96IN PROBE COVER (10 PK): Brand: SITE-RITE

## (undated) DEVICE — CATHETER EP 7FR L115CM 2-8-2MM SPC TIP 2MM 10 ELECTRD FJ

## (undated) DEVICE — PINNACLE TIF INTRODUCER SHEATH: Brand: PINNACLE

## (undated) DEVICE — KENDALL RADIOLUCENT FOAM MONITORING ELECTRODE RECTANGULAR SHAPE: Brand: KENDALL

## (undated) DEVICE — INTRODUCER SHTH SL1 0.032 IN 8.5 FRX63 CM FAST-CATH

## (undated) DEVICE — TUBING PMP FOR CARTO SYS SMARTABLATE

## (undated) DEVICE — CANNULA NSL ORAL AD FOR CAPNOFLEX CO2 O2 AIRLFE

## (undated) DEVICE — CATHETER US GE COMPATIBILITY SOUNDSTAR ECO 10FR

## (undated) DEVICE — SYSTEM CLOSURE 6-12 FR VEN VASC VASCADE MVP

## (undated) DEVICE — CATHETER EP 7FR L115CM 2-8-2MM SPC TIP 2MM 10 ELECTRD F L